# Patient Record
Sex: MALE | Race: BLACK OR AFRICAN AMERICAN | Employment: OTHER | URBAN - NONMETROPOLITAN AREA
[De-identification: names, ages, dates, MRNs, and addresses within clinical notes are randomized per-mention and may not be internally consistent; named-entity substitution may affect disease eponyms.]

---

## 2021-12-19 ENCOUNTER — HOSPITAL ENCOUNTER (EMERGENCY)
Age: 61
Discharge: HOME | End: 2021-12-19
Attending: EMERGENCY MEDICINE
Payer: MEDICARE

## 2021-12-19 VITALS
DIASTOLIC BLOOD PRESSURE: 92 MMHG | WEIGHT: 130 LBS | HEART RATE: 83 BPM | BODY MASS INDEX: 20.89 KG/M2 | HEIGHT: 66 IN | RESPIRATION RATE: 18 BRPM | TEMPERATURE: 97.8 F | OXYGEN SATURATION: 96 % | SYSTOLIC BLOOD PRESSURE: 141 MMHG

## 2021-12-19 DIAGNOSIS — M75.90 BURSITIS AND TENDINITIS OF SHOULDER REGION: ICD-10-CM

## 2021-12-19 DIAGNOSIS — M75.50 BURSITIS AND TENDINITIS OF SHOULDER REGION: ICD-10-CM

## 2021-12-19 DIAGNOSIS — M70.21 OLECRANON BURSITIS OF BOTH ELBOWS: Primary | ICD-10-CM

## 2021-12-19 DIAGNOSIS — M70.22 OLECRANON BURSITIS OF BOTH ELBOWS: Primary | ICD-10-CM

## 2021-12-19 PROCEDURE — 74011250636 HC RX REV CODE- 250/636: Performed by: EMERGENCY MEDICINE

## 2021-12-19 PROCEDURE — 96372 THER/PROPH/DIAG INJ SC/IM: CPT

## 2021-12-19 PROCEDURE — 99282 EMERGENCY DEPT VISIT SF MDM: CPT

## 2021-12-19 RX ORDER — IBUPROFEN 600 MG/1
600 TABLET ORAL
Qty: 20 TABLET | Refills: 0 | Status: SHIPPED | OUTPATIENT
Start: 2021-12-19 | End: 2022-02-19

## 2021-12-19 RX ORDER — PREDNISONE 20 MG/1
20 TABLET ORAL DAILY
Qty: 10 TABLET | Refills: 0 | Status: SHIPPED | OUTPATIENT
Start: 2021-12-19 | End: 2021-12-29

## 2021-12-19 RX ORDER — KETOROLAC TROMETHAMINE 30 MG/ML
60 INJECTION, SOLUTION INTRAMUSCULAR; INTRAVENOUS
Status: COMPLETED | OUTPATIENT
Start: 2021-12-19 | End: 2021-12-19

## 2021-12-19 RX ADMIN — KETOROLAC TROMETHAMINE 60 MG: 30 INJECTION, SOLUTION INTRAMUSCULAR at 18:55

## 2021-12-19 RX ADMIN — METHYLPREDNISOLONE SODIUM SUCCINATE 125 MG: 125 INJECTION, POWDER, FOR SOLUTION INTRAMUSCULAR; INTRAVENOUS at 18:55

## 2021-12-19 NOTE — ED PROVIDER NOTES
EMERGENCY DEPARTMENT HISTORY AND PHYSICAL EXAM      Date: 12/19/2021  Patient Name: Neftali Ivey    History of Presenting Illness     Chief Complaint   Patient presents with    Back Pain    Arm Pain    Shoulder Pain       History Provided By: Patient    HPI: Neftali Ivey, 62 y.o. male with a past medical history significant hypertension presents to the ED with cc of bilateral elbow pain bilateral shoulder pain back pain for 3 months patient denies any trauma    There are no other complaints, changes, or physical findings at this time. PCP: None    No current facility-administered medications on file prior to encounter. No current outpatient medications on file prior to encounter. Past History     Past Medical History:  Past Medical History:   Diagnosis Date    Hypertension        Past Surgical History:  History reviewed. No pertinent surgical history. Family History:  History reviewed. No pertinent family history. Social History:  Social History     Tobacco Use    Smoking status: Current Every Day Smoker     Packs/day: 0.25    Smokeless tobacco: Never Used   Substance Use Topics    Alcohol use: Yes     Alcohol/week: 3.0 standard drinks     Types: 3 Cans of beer per week     Comment: daily    Drug use: Never       Allergies:  No Known Allergies      Review of Systems     Review of Systems   Constitutional: Negative for chills and fever. HENT: Negative for rhinorrhea and sore throat. Eyes: Negative for pain and visual disturbance. Respiratory: Negative for cough and shortness of breath. Cardiovascular: Negative for chest pain and leg swelling. Gastrointestinal: Negative for abdominal pain, diarrhea and vomiting. Endocrine: Negative for polydipsia and polyuria. Genitourinary: Negative for dysuria and urgency. Musculoskeletal: Positive for arthralgias, back pain and joint swelling. Negative for neck pain and neck stiffness.    Skin: Negative for color change, pallor and wound.   Neurological: Negative for weakness and headaches. Psychiatric/Behavioral: Negative. Physical Exam     Physical Exam  Vitals and nursing note reviewed. Constitutional:       General: He is not in acute distress. Appearance: Normal appearance. He is not ill-appearing, toxic-appearing or diaphoretic. HENT:      Head: Normocephalic and atraumatic. Nose: Nose normal.      Mouth/Throat:      Mouth: Mucous membranes are moist.      Pharynx: Oropharynx is clear. Eyes:      Extraocular Movements: Extraocular movements intact. Conjunctiva/sclera: Conjunctivae normal.      Pupils: Pupils are equal, round, and reactive to light. Cardiovascular:      Rate and Rhythm: Normal rate and regular rhythm. Pulses: Normal pulses. Heart sounds: Normal heart sounds. Pulmonary:      Effort: Pulmonary effort is normal.      Breath sounds: Normal breath sounds. Abdominal:      General: Bowel sounds are normal.      Palpations: Abdomen is soft. Tenderness: There is no abdominal tenderness. Musculoskeletal:         General: Swelling and tenderness present. No signs of injury. Right shoulder: Tenderness present. No swelling, bony tenderness or crepitus. Left shoulder: Tenderness present. No swelling, bony tenderness or crepitus. Right elbow: Swelling present. No deformity, effusion or lacerations. Tenderness present in olecranon process. Left elbow: Swelling present. No deformity, effusion or lacerations. Tenderness present in olecranon process. Cervical back: Normal, normal range of motion and neck supple. Thoracic back: Tenderness and bony tenderness present. No deformity or signs of trauma. Lumbar back: Tenderness and bony tenderness present. No deformity or signs of trauma. Comments: No increased warmth, no erythema to overlying skin   Skin:     General: Skin is warm and dry. Capillary Refill: Capillary refill takes less than 2 seconds. Neurological:      General: No focal deficit present. Mental Status: He is alert and oriented to person, place, and time. Psychiatric:         Mood and Affect: Mood normal.         Behavior: Behavior normal.         Lab and Diagnostic Study Results     Labs -   No results found for this or any previous visit (from the past 12 hour(s)). Radiologic Studies -   @lastxrresult@  CT Results  (Last 48 hours)    None        CXR Results  (Last 48 hours)    None            Medical Decision Making   - I am the first provider for this patient. - I reviewed the vital signs, available nursing notes, past medical history, past surgical history, family history and social history. - Initial assessment performed. The patients presenting problems have been discussed, and they are in agreement with the care plan formulated and outlined with them. I have encouraged them to ask questions as they arise throughout their visit. Vital Signs-Reviewed the patient's vital signs. Patient Vitals for the past 12 hrs:   Temp Pulse Resp BP SpO2   12/19/21 1825 97.8 °F (36.6 °C) 83 18 (!) 148/126 96 %       Records Reviewed: Nursing Notes    The patient presents with diffuse joint pain with a differential diagnosis of inflammatory process, musculoskeletal strain      ED Course:          Provider Notes (Medical Decision Making): MDM       Procedures   Medical Decision Makingedical Decision Making  Performed by: Miguel Alcaraz MD  PROCEDURES:  Procedures       Disposition   Disposition: Condition stable and improved  DC- Adult Discharges: All of the diagnostic tests were reviewed and questions answered. Diagnosis, care plan and treatment options were discussed. The patient understands the instructions and will follow up as directed. The patients results have been reviewed with them. They have been counseled regarding their diagnosis.   The patient verbally convey understanding and agreement of the signs, symptoms, diagnosis, treatment and prognosis and additionally agrees to follow up as recommended with their PCP in 24 - 48 hours. They also agree with the care-plan and convey that all of their questions have been answered. I have also put together some discharge instructions for them that include: 1) educational information regarding their diagnosis, 2) how to care for their diagnosis at home, as well a 3) list of reasons why they would want to return to the ED prior to their follow-up appointment, should their condition change. DISCHARGE PLAN:  1. There are no discharge medications for this patient. 2.   Follow-up Information    None       3. Return to ED if worse   4. There are no discharge medications for this patient. Diagnosis     Clinical Impression: No diagnosis found. Attestations:    Abilio Dash MD    Please note that this dictation was completed with [employer], the computer voice recognition software. Quite often unanticipated grammatical, syntax, homophones, and other interpretive errors are inadvertently transcribed by the computer software. Please disregard these errors. Please excuse any errors that have escaped final proofreading. Thank you.

## 2021-12-19 NOTE — ED TRIAGE NOTES
Patient has swelling to both elbows; admits he has slacked in PCP visits, but the pain is progressively worse

## 2022-02-19 ENCOUNTER — HOSPITAL ENCOUNTER (EMERGENCY)
Age: 62
Discharge: HOME | End: 2022-02-19
Attending: EMERGENCY MEDICINE
Payer: MEDICARE

## 2022-02-19 ENCOUNTER — APPOINTMENT (OUTPATIENT)
Dept: GENERAL RADIOLOGY | Age: 62
End: 2022-02-19
Attending: EMERGENCY MEDICINE
Payer: MEDICARE

## 2022-02-19 VITALS
OXYGEN SATURATION: 98 % | RESPIRATION RATE: 19 BRPM | TEMPERATURE: 98.6 F | SYSTOLIC BLOOD PRESSURE: 156 MMHG | HEIGHT: 66 IN | HEART RATE: 97 BPM | DIASTOLIC BLOOD PRESSURE: 96 MMHG | BODY MASS INDEX: 20.89 KG/M2 | WEIGHT: 130 LBS

## 2022-02-19 DIAGNOSIS — R07.9 CHEST PAIN, UNSPECIFIED TYPE: Primary | ICD-10-CM

## 2022-02-19 LAB
ANION GAP SERPL CALC-SCNC: 16 MMOL/L (ref 5–15)
BASOPHILS # BLD: 0.2 K/UL (ref 0–0.2)
BASOPHILS NFR BLD: 3 % (ref 0–2.5)
BNP SERPL-MCNC: 98 PG/ML
BUN SERPL-MCNC: 11 MG/DL (ref 6–20)
BUN/CREAT SERPL: 12 (ref 12–20)
CA-I BLD-MCNC: 9 MG/DL (ref 8.5–10.1)
CHLORIDE SERPL-SCNC: 100 MMOL/L (ref 97–108)
CO2 SERPL-SCNC: 22 MMOL/L (ref 21–32)
CREAT SERPL-MCNC: 0.94 MG/DL (ref 0.7–1.3)
EOSINOPHIL # BLD: 0.1 K/UL (ref 0–0.7)
EOSINOPHIL NFR BLD: 1 % (ref 0.9–2.9)
ERYTHROCYTE [DISTWIDTH] IN BLOOD BY AUTOMATED COUNT: 14.9 % (ref 11.5–14.5)
GLUCOSE SERPL-MCNC: 91 MG/DL (ref 65–100)
HCT VFR BLD AUTO: 46.7 % (ref 41–53)
HGB BLD-MCNC: 15.8 G/DL (ref 13.5–17.5)
INR PPP: 0.9 (ref 0.9–1.1)
LYMPHOCYTES # BLD: 1 K/UL (ref 1–4.8)
LYMPHOCYTES NFR BLD: 13 % (ref 20.5–51.1)
MCH RBC QN AUTO: 31.4 PG (ref 31–34)
MCHC RBC AUTO-ENTMCNC: 33.8 G/DL (ref 31–36)
MCV RBC AUTO: 93 FL (ref 80–100)
MONOCYTES # BLD: 0.5 K/UL (ref 0.2–2.4)
MONOCYTES NFR BLD: 7 % (ref 1.7–9.3)
NEUTS SEG # BLD: 5.9 K/UL (ref 1.8–7.7)
NEUTS SEG NFR BLD: 76 % (ref 42–75)
NRBC # BLD: 0.01 K/UL
NRBC BLD-RTO: 0.1 PER 100 WBC
PLATELET # BLD AUTO: 278 K/UL (ref 150–400)
PMV BLD AUTO: 6.9 FL (ref 6.5–11.5)
POTASSIUM SERPL-SCNC: 3.5 MMOL/L (ref 3.5–5.1)
PROTHROMBIN TIME: 11.5 SEC (ref 11.9–14.6)
RBC # BLD AUTO: 5.02 M/UL (ref 4.5–5.9)
SODIUM SERPL-SCNC: 138 MMOL/L (ref 136–145)
TROPONIN-HIGH SENSITIVITY: 7 NG/L (ref 0–76)
WBC # BLD AUTO: 7.6 K/UL (ref 4.4–11.3)

## 2022-02-19 PROCEDURE — 84484 ASSAY OF TROPONIN QUANT: CPT

## 2022-02-19 PROCEDURE — 85025 COMPLETE CBC W/AUTO DIFF WBC: CPT

## 2022-02-19 PROCEDURE — 85610 PROTHROMBIN TIME: CPT

## 2022-02-19 PROCEDURE — 36415 COLL VENOUS BLD VENIPUNCTURE: CPT

## 2022-02-19 PROCEDURE — 99284 EMERGENCY DEPT VISIT MOD MDM: CPT

## 2022-02-19 PROCEDURE — 71045 X-RAY EXAM CHEST 1 VIEW: CPT

## 2022-02-19 PROCEDURE — 83880 ASSAY OF NATRIURETIC PEPTIDE: CPT

## 2022-02-19 PROCEDURE — 93005 ELECTROCARDIOGRAM TRACING: CPT

## 2022-02-19 PROCEDURE — 80048 BASIC METABOLIC PNL TOTAL CA: CPT

## 2022-02-19 RX ORDER — FAMOTIDINE 20 MG/1
20 TABLET, FILM COATED ORAL 2 TIMES DAILY
Qty: 20 TABLET | Refills: 0 | Status: SHIPPED | OUTPATIENT
Start: 2022-02-19 | End: 2022-03-01

## 2022-02-19 RX ORDER — METOPROLOL TARTRATE 50 MG/1
50 TABLET ORAL
Status: DISCONTINUED | OUTPATIENT
Start: 2022-02-19 | End: 2022-02-19 | Stop reason: HOSPADM

## 2022-02-19 RX ORDER — GUAIFENESIN 100 MG/5ML
324 LIQUID (ML) ORAL
Status: DISCONTINUED | OUTPATIENT
Start: 2022-02-19 | End: 2022-02-19 | Stop reason: HOSPADM

## 2022-02-19 NOTE — ED PROVIDER NOTES
Patient brought to ER with complaint of chest pain for 2 months. He also reports left shoulder pain worse with movement. Pain is moderate. No shortness of breath, nausea or diaphoresis. Duration:  2 months    Intensity: moderate    Modified by: movement    Social History : smokes cigarettes. Drinks occasionally               Past Medical History:   Diagnosis Date    Hypertension        Past Surgical History:   Procedure Laterality Date    HX ORTHOPAEDIC           History reviewed. No pertinent family history. Social History     Socioeconomic History    Marital status: UNKNOWN     Spouse name: Not on file    Number of children: Not on file    Years of education: Not on file    Highest education level: Not on file   Occupational History    Not on file   Tobacco Use    Smoking status: Current Every Day Smoker     Packs/day: 0.25    Smokeless tobacco: Never Used   Vaping Use    Vaping Use: Never used   Substance and Sexual Activity    Alcohol use: Yes     Alcohol/week: 3.0 standard drinks     Types: 3 Cans of beer per week     Comment: daily    Drug use: Never    Sexual activity: Not Currently   Other Topics Concern    Not on file   Social History Narrative    Not on file     Social Determinants of Health     Financial Resource Strain:     Difficulty of Paying Living Expenses: Not on file   Food Insecurity:     Worried About Running Out of Food in the Last Year: Not on file    Luis E of Food in the Last Year: Not on file   Transportation Needs:     Lack of Transportation (Medical): Not on file    Lack of Transportation (Non-Medical):  Not on file   Physical Activity:     Days of Exercise per Week: Not on file    Minutes of Exercise per Session: Not on file   Stress:     Feeling of Stress : Not on file   Social Connections:     Frequency of Communication with Friends and Family: Not on file    Frequency of Social Gatherings with Friends and Family: Not on file    Attends Quaker Services: Not on file    Active Member of Clubs or Organizations: Not on file    Attends Club or Organization Meetings: Not on file    Marital Status: Not on file   Intimate Partner Violence:     Fear of Current or Ex-Partner: Not on file    Emotionally Abused: Not on file    Physically Abused: Not on file    Sexually Abused: Not on file   Housing Stability:     Unable to Pay for Housing in the Last Year: Not on file    Number of [place] in the Last Year: Not on file    Unstable Housing in the Last Year: Not on file         ALLERGIES: Patient has no known allergies. Review of Systems   Constitutional: Negative. Negative for chills, diaphoresis and fever. HENT: Negative. Eyes: Negative. Respiratory: Negative. Negative for shortness of breath. Cardiovascular: Positive for chest pain. Gastrointestinal: Negative for nausea and vomiting. Endocrine: Negative. Genitourinary: Negative. Musculoskeletal: Negative. Left shoulder pain   Skin: Negative. Allergic/Immunologic: Negative. Neurological: Negative. Hematological: Negative. Psychiatric/Behavioral: Negative. Vitals:    02/19/22 0136   BP: (!) 168/98   Pulse: (!) 109   Resp: 19   Temp: 98.6 °F (37 °C)   SpO2: 95%   Weight: 59 kg (130 lb)   Height: 5' 6\" (1.676 m)            Physical Exam  Vitals and nursing note reviewed. Constitutional:       General: He is not in acute distress. Appearance: He is not ill-appearing. HENT:      Head: Normocephalic and atraumatic. Cardiovascular:      Rate and Rhythm: Normal rate and regular rhythm. Pulses: Normal pulses. Heart sounds: Normal heart sounds. Pulmonary:      Effort: Pulmonary effort is normal.      Breath sounds: Normal breath sounds. Abdominal:      General: Abdomen is flat. Bowel sounds are normal.      Palpations: Abdomen is soft. Musculoskeletal:         General: Normal range of motion.       Cervical back: Normal range of motion and neck supple. Skin:     General: Skin is warm and dry. Neurological:      General: No focal deficit present. Mental Status: He is oriented to person, place, and time. Mental status is at baseline.    Psychiatric:         Mood and Affect: Mood normal.         Behavior: Behavior normal.          MDM       Procedures

## 2022-02-19 NOTE — ED NOTES
Pt with sister at bedside in which assiting pt in going home. Pt verbalized understanding of medication prescribed as well follow up care. Pt verbalized no further questions.

## 2022-02-19 NOTE — ED TRIAGE NOTES
Pt states that he has been having chest pain for about a month, shoulder pain for over a year, and has been without hypertension medications for over six months. Pt states that tonight it got hard to breathe and his chest began to hurt.  Rate 8/10

## 2022-02-21 LAB
ATRIAL RATE: 106 BPM
CALCULATED P AXIS, ECG09: 75 DEGREES
CALCULATED R AXIS, ECG10: 71 DEGREES
CALCULATED T AXIS, ECG11: 68 DEGREES
DIAGNOSIS, 93000: NORMAL
P-R INTERVAL, ECG05: 175 MS
Q-T INTERVAL, ECG07: 350 MS
QRS DURATION, ECG06: 79 MS
QTC CALCULATION (BEZET), ECG08: 465 MS
VENTRICULAR RATE, ECG03: 106 BPM

## 2022-03-25 ENCOUNTER — HOSPITAL ENCOUNTER (EMERGENCY)
Age: 62
Discharge: HOME | End: 2022-03-25
Attending: EMERGENCY MEDICINE | Admitting: EMERGENCY MEDICINE
Payer: MEDICARE

## 2022-03-25 ENCOUNTER — APPOINTMENT (OUTPATIENT)
Dept: CT IMAGING | Age: 62
End: 2022-03-25
Attending: EMERGENCY MEDICINE
Payer: MEDICARE

## 2022-03-25 VITALS
SYSTOLIC BLOOD PRESSURE: 162 MMHG | OXYGEN SATURATION: 98 % | TEMPERATURE: 98.4 F | RESPIRATION RATE: 16 BRPM | WEIGHT: 130 LBS | HEIGHT: 66 IN | DIASTOLIC BLOOD PRESSURE: 87 MMHG | BODY MASS INDEX: 20.89 KG/M2 | HEART RATE: 74 BPM

## 2022-03-25 DIAGNOSIS — R10.31 RLQ ABDOMINAL PAIN: Primary | ICD-10-CM

## 2022-03-25 LAB
ALBUMIN SERPL-MCNC: 3.2 G/DL (ref 3.5–5)
ALBUMIN/GLOB SERPL: 1 {RATIO} (ref 1.1–2.2)
ALP SERPL-CCNC: 80 U/L (ref 45–117)
ALT SERPL-CCNC: 17 U/L (ref 12–78)
ANION GAP SERPL CALC-SCNC: 5 MMOL/L (ref 5–15)
APPEARANCE UR: CLEAR
AST SERPL W P-5'-P-CCNC: 16 U/L (ref 15–37)
BACTERIA URNS QL MICRO: NEGATIVE /HPF
BASOPHILS # BLD: 0 K/UL (ref 0–0.1)
BASOPHILS NFR BLD: 1 % (ref 0–1)
BILIRUB SERPL-MCNC: 0.2 MG/DL (ref 0.2–1)
BILIRUB UR QL: NEGATIVE
BUN SERPL-MCNC: 12 MG/DL (ref 6–20)
BUN/CREAT SERPL: 20 (ref 12–20)
CA-I BLD-MCNC: 8.4 MG/DL (ref 8.5–10.1)
CHLORIDE SERPL-SCNC: 104 MMOL/L (ref 97–108)
CO2 SERPL-SCNC: 27 MMOL/L (ref 21–32)
COLOR UR: ABNORMAL
CREAT SERPL-MCNC: 0.61 MG/DL (ref 0.7–1.3)
DIFFERENTIAL METHOD BLD: ABNORMAL
EOSINOPHIL # BLD: 0.1 K/UL (ref 0–0.4)
EOSINOPHIL NFR BLD: 1 % (ref 0–7)
ERYTHROCYTE [DISTWIDTH] IN BLOOD BY AUTOMATED COUNT: 14.3 % (ref 11.5–14.5)
GLOBULIN SER CALC-MCNC: 3.3 G/DL (ref 2–4)
GLUCOSE SERPL-MCNC: 99 MG/DL (ref 65–100)
GLUCOSE UR STRIP.AUTO-MCNC: 50 MG/DL
HCT VFR BLD AUTO: 39.4 % (ref 36.6–50.3)
HGB BLD-MCNC: 13.5 G/DL (ref 12.1–17)
HGB UR QL STRIP: NEGATIVE
IMM GRANULOCYTES # BLD AUTO: 0 K/UL (ref 0–0.04)
IMM GRANULOCYTES NFR BLD AUTO: 0 % (ref 0–0.5)
KETONES UR QL STRIP.AUTO: 5 MG/DL
LEUKOCYTE ESTERASE UR QL STRIP.AUTO: ABNORMAL
LIPASE SERPL-CCNC: 104 U/L (ref 73–393)
LYMPHOCYTES # BLD: 1.4 K/UL (ref 0.8–3.5)
LYMPHOCYTES NFR BLD: 23 % (ref 12–49)
MCH RBC QN AUTO: 31.6 PG (ref 26–34)
MCHC RBC AUTO-ENTMCNC: 34.3 G/DL (ref 30–36.5)
MCV RBC AUTO: 92.3 FL (ref 80–99)
MONOCYTES # BLD: 0.5 K/UL (ref 0–1)
MONOCYTES NFR BLD: 8 % (ref 5–13)
MUCOUS THREADS URNS QL MICRO: ABNORMAL /LPF
NEUTS SEG # BLD: 4.2 K/UL (ref 1.8–8)
NEUTS SEG NFR BLD: 67 % (ref 32–75)
NITRITE UR QL STRIP.AUTO: NEGATIVE
NRBC # BLD: 0 K/UL (ref 0–0.01)
NRBC BLD-RTO: 0 PER 100 WBC
PH UR STRIP: 5 [PH] (ref 5–8)
PLATELET # BLD AUTO: 222 K/UL (ref 150–400)
PMV BLD AUTO: 8.7 FL (ref 8.9–12.9)
POTASSIUM SERPL-SCNC: 4.1 MMOL/L (ref 3.5–5.1)
PROT SERPL-MCNC: 6.5 G/DL (ref 6.4–8.2)
PROT UR STRIP-MCNC: NEGATIVE MG/DL
RBC # BLD AUTO: 4.27 M/UL (ref 4.1–5.7)
RBC #/AREA URNS HPF: ABNORMAL /HPF (ref 0–5)
SODIUM SERPL-SCNC: 136 MMOL/L (ref 136–145)
SP GR UR REFRACTOMETRY: 1.03 (ref 1–1.03)
UA: UC IF INDICATED,UAUC: ABNORMAL
UROBILINOGEN UR QL STRIP.AUTO: 0.1 EU/DL (ref 0.1–1)
WBC # BLD AUTO: 6.1 K/UL (ref 4.1–11.1)
WBC URNS QL MICRO: ABNORMAL /HPF (ref 0–4)

## 2022-03-25 PROCEDURE — 74011000636 HC RX REV CODE- 636: Performed by: EMERGENCY MEDICINE

## 2022-03-25 PROCEDURE — 81001 URINALYSIS AUTO W/SCOPE: CPT

## 2022-03-25 PROCEDURE — 99285 EMERGENCY DEPT VISIT HI MDM: CPT

## 2022-03-25 PROCEDURE — 80053 COMPREHEN METABOLIC PANEL: CPT

## 2022-03-25 PROCEDURE — 85025 COMPLETE CBC W/AUTO DIFF WBC: CPT

## 2022-03-25 PROCEDURE — 74177 CT ABD & PELVIS W/CONTRAST: CPT

## 2022-03-25 PROCEDURE — 36415 COLL VENOUS BLD VENIPUNCTURE: CPT

## 2022-03-25 PROCEDURE — 83690 ASSAY OF LIPASE: CPT

## 2022-03-25 RX ADMIN — IOPAMIDOL 100 ML: 755 INJECTION, SOLUTION INTRAVENOUS at 16:29

## 2022-03-25 NOTE — ED PROVIDER NOTES
EMERGENCY DEPARTMENT HISTORY AND PHYSICAL EXAM      Date: 3/25/2022  Patient Name: Nikko Sanders      History of Presenting Illness     Chief Complaint   Patient presents with    Abdominal Pain       History Provided By: Patient    HPI: Nikko Sanders, 62 y.o. male with a past medical history significant for HTN, Arthritis presents to the ED with cc of abdominal pain. RLQ pain intermittent past 3 weeks. Told by PCP to come to ER for CTAP. Endorses few days of black stools but not tarry. Denies hematochezia, CP, SOB, N/V/D. Denies F/C, cough. Not on AC. There are no other complaints, changes, or physical findings at this time. PCP: Sammy Velez MD        Past History     Past Medical History:  Past Medical History:   Diagnosis Date    Arthritis     Hypertension        Past Surgical History:  Past Surgical History:   Procedure Laterality Date    HX ORTHOPAEDIC         Family History:  History reviewed. No pertinent family history. Social History:  Social History     Tobacco Use    Smoking status: Current Some Day Smoker     Packs/day: 0.25    Smokeless tobacco: Never Used   Vaping Use    Vaping Use: Never used   Substance Use Topics    Alcohol use: Yes     Alcohol/week: 3.0 standard drinks     Types: 3 Cans of beer per week     Comment: daily    Drug use: Never       Allergies:  No Known Allergies      Review of Systems   Constitutional: Negative except as in HPI. Eyes: Negative except as in HPI.  ENT: Negative except as in HPI. Cardiovascular: Negative except as in HPI. Respiratory: Negative except as in HPI. Gastrointestinal: Negative except as in HPI. Genitourinary: Negative except as in HPI. Musculoskeletal: Negative except as in HPI. Integumentary: Negative except as in HPI. Neurological: Negative except as in HPI. Psychiatric: Negative except as in HPI. Endocrine: Negative except as in HPI. Hematologic/Lymphatic: Negative except as in HPI.   Allergic/Immunologic: Negative except as in HPI. Physical Exam   Constitutional: Awake and alert, interactive, NAD  Eyes: PERRL, no injection or scleral icterus, no discharge  HEENT: NCAT, neck supple, MMM, no oropharyngeal exudates  CV: RRR, no m/r/g  Respiratory: CTAB, no r/r/w  GI: Abd soft, nondistended, ttp RLQ and RUQ, slightly guarding. : No blood or melena on BRYNN.   MSK: FROM, no joint effusions or edema  Skin: No rashes  Neuro: CN2-12 intact, symmetric facies, fluent speech. Psych: Well-groomed, normal speech, behavior, appropriate mood    Lab and Diagnostic Study Results     Labs -     Recent Results (from the past 12 hour(s))   CBC WITH AUTOMATED DIFF    Collection Time: 03/25/22  2:41 PM   Result Value Ref Range    WBC 6.1 4.1 - 11.1 K/uL    RBC 4.27 4. 10 - 5.70 M/uL    HGB 13.5 12.1 - 17.0 g/dL    HCT 39.4 36.6 - 50.3 %    MCV 92.3 80.0 - 99.0 FL    MCH 31.6 26.0 - 34.0 PG    MCHC 34.3 30.0 - 36.5 g/dL    RDW 14.3 11.5 - 14.5 %    PLATELET 231 488 - 813 K/uL    MPV 8.7 (L) 8.9 - 12.9 FL    NRBC 0.0 0.0  WBC    ABSOLUTE NRBC 0.00 0.00 - 0.01 K/uL    NEUTROPHILS 67 32 - 75 %    LYMPHOCYTES 23 12 - 49 %    MONOCYTES 8 5 - 13 %    EOSINOPHILS 1 0 - 7 %    BASOPHILS 1 0 - 1 %    IMMATURE GRANULOCYTES 0 0 - 0.5 %    ABS. NEUTROPHILS 4.2 1.8 - 8.0 K/UL    ABS. LYMPHOCYTES 1.4 0.8 - 3.5 K/UL    ABS. MONOCYTES 0.5 0.0 - 1.0 K/UL    ABS. EOSINOPHILS 0.1 0.0 - 0.4 K/UL    ABS. BASOPHILS 0.0 0.0 - 0.1 K/UL    ABS. IMM.  GRANS. 0.0 0.00 - 0.04 K/UL    DF AUTOMATED     METABOLIC PANEL, COMPREHENSIVE    Collection Time: 03/25/22  2:41 PM   Result Value Ref Range    Sodium 136 136 - 145 mmol/L    Potassium 4.1 3.5 - 5.1 mmol/L    Chloride 104 97 - 108 mmol/L    CO2 27 21 - 32 mmol/L    Anion gap 5 5 - 15 mmol/L    Glucose 99 65 - 100 mg/dL    BUN 12 6 - 20 mg/dL    Creatinine 0.61 (L) 0.70 - 1.30 mg/dL    BUN/Creatinine ratio 20 12 - 20      GFR est AA >60 >60 ml/min/1.73m2    GFR est non-AA >60 >60 ml/min/1.73m2    Calcium 8.4 (L) 8.5 - 10.1 mg/dL    Bilirubin, total 0.2 0.2 - 1.0 mg/dL    AST (SGOT) 16 15 - 37 U/L    ALT (SGPT) 17 12 - 78 U/L    Alk. phosphatase 80 45 - 117 U/L    Protein, total 6.5 6.4 - 8.2 g/dL    Albumin 3.2 (L) 3.5 - 5.0 g/dL    Globulin 3.3 2.0 - 4.0 g/dL    A-G Ratio 1.0 (L) 1.1 - 2.2     LIPASE    Collection Time: 03/25/22  2:41 PM   Result Value Ref Range    Lipase 104 73 - 393 U/L   URINALYSIS W/ REFLEX CULTURE    Collection Time: 03/25/22  2:49 PM    Specimen: Urine   Result Value Ref Range    Color Yellow/Straw      Appearance Clear Clear      Specific gravity 1.027 1.003 - 1.030      pH (UA) 5.0 5.0 - 8.0      Protein Negative Negative mg/dL    Glucose 50 (A) Negative mg/dL    Ketone 5 (A) Negative mg/dL    Bilirubin Negative Negative      Blood Negative Negative      Urobilinogen 0.1 0.1 - 1.0 EU/dL    Nitrites Negative Negative      Leukocyte Esterase Trace (A) Negative      UA:UC IF INDICATED Culture not indicated by UA result Culture not indicated by UA result      WBC 0-4 0 - 4 /hpf    RBC 0-5 0 - 5 /hpf    Bacteria Negative Negative /hpf    Mucus Trace /lpf       Radiologic Studies -   [unfilled]  CT Results  (Last 48 hours)               03/25/22 1628  CT ABD PELV W CONT Final result    Impression:  No suggestion of acute cholecystitis. Prostatic enlargement likely   causing bladder outlet obstruction. Accelerated atherosclerosis. Severe   degenerative changes at the lumbosacral junction; is there any suspicion of   chronic disc space infection? Narrative:  CT dose reduction was achieved through use of a standardized protocol tailored   for this examination and automatic exposure control for dose modulation. Contrast study shows clear lung bases       Small liver cyst. Spleen, pancreas, gallbladder are normal. Specifically, no   gallstones seen and no biliary dilatation. No regional edema. Small adrenal   adenomas, clear fat content in the larger left-sided 1, at 2 cm.  Small cyst in   each kidney. Advanced arterial calcification. Fluid present throughout   nondilated small bowel       Moderate central prosthetic enlargement. Normal bladder, colon and appendix. Advanced iliofemoral arterial calcification. No mesenteric fat edema or   abdominal wall hernia       Severe chronic changes at L5-S1. CXR Results  (Last 48 hours)    None          Medical Decision Making and ED Course   - I am the first and primary provider for this patient AND AM THE PRIMARY PROVIDER OF RECORD. - I reviewed the vital signs, available nursing notes, past medical history, past surgical history, family history and social history. - Initial assessment performed. The patients presenting problems have been discussed, and the staff are in agreement with the care plan formulated and outlined with them. I have encouraged them to ask questions as they arise throughout their visit. Vital Signs-Reviewed the patient's vital signs. Patient Vitals for the past 12 hrs:   Temp Pulse Resp BP SpO2   03/25/22 1438 98.4 °F (36.9 °C) 74 16 (!) 162/87 98 %       EKG interpretation:         Provider Notes (Medical Decision Making):   61M w/abdominal pain, ?melena vs dark stools. No e/o bleeding on exam. Will get CTAP to eval for appendicitis, diverticulitis, obstruction, etc. Dispo pending workup. ED Course:       ED Course as of 03/25/22 1642   Fri Mar 25, 2022   1525 HGB: 13.5 [YA]   1639 CT ABD PELV W CONT    IMPRESSION  No suggestion of acute cholecystitis. Prostatic enlargement likely  causing bladder outlet obstruction. Accelerated atherosclerosis. Severe  degenerative changes at the lumbosacral junction; is there any suspicion of  chronic disc space infection? [YA]   4391 Suspect enteritis causing pain. No THAD. Tolerating PO. Hgb wnl and no e/o melena on exam. Will discharge with return precautions.  [YA]      ED Course User Index  [YA] Rita Brennan MD           Disposition     Disposition: DC- Adult Discharges: All of the diagnostic tests were reviewed and questions answered. Diagnosis, care plan and treatment options were discussed. The patient understands the instructions and will follow up as directed. The patients results have been reviewed with them. They have been counseled regarding their diagnosis. The patient verbally convey understanding and agreement of the signs, symptoms, diagnosis, treatment and prognosis and additionally agrees to follow up as recommended with their PCP in 24 - 48 hours. They also agree with the care-plan and convey that all of their questions have been answered. I have also put together some discharge instructions for them that include: 1) educational information regarding their diagnosis, 2) how to care for their diagnosis at home, as well a 3) list of reasons why they would want to return to the ED prior to their follow-up appointment, should their condition change. Discharged      Diagnosis     Clinical Impression:   1. RLQ abdominal pain        Attestations:     Ana Arnold MD

## 2022-03-25 NOTE — DISCHARGE INSTRUCTIONS
You were seen in the ER for your lower abdominal pain. Thankfully, we were able to rule out dangerous causes like a dangerous infection or bleeding in your stool. This may be from a number of issues. You should follow up with your primary care doctor or a stomach doctor to make sure this problem is getting better. Return to the ER for any new severe pain, vomiting, fevers, or any other new or concerning symptoms. Thank you! Thank you for allowing me to care for you in the emergency department. I sincerely hope that you are satisfied with your visit today. It is my goal to provide you with excellent care. Below you will find a list of your labs and imaging from your visit today. Should you have any questions regarding these results please do not hesitate to call the emergency department. Labs -     Recent Results (from the past 12 hour(s))   CBC WITH AUTOMATED DIFF    Collection Time: 03/25/22  2:41 PM   Result Value Ref Range    WBC 6.1 4.1 - 11.1 K/uL    RBC 4.27 4. 10 - 5.70 M/uL    HGB 13.5 12.1 - 17.0 g/dL    HCT 39.4 36.6 - 50.3 %    MCV 92.3 80.0 - 99.0 FL    MCH 31.6 26.0 - 34.0 PG    MCHC 34.3 30.0 - 36.5 g/dL    RDW 14.3 11.5 - 14.5 %    PLATELET 297 561 - 780 K/uL    MPV 8.7 (L) 8.9 - 12.9 FL    NRBC 0.0 0.0  WBC    ABSOLUTE NRBC 0.00 0.00 - 0.01 K/uL    NEUTROPHILS 67 32 - 75 %    LYMPHOCYTES 23 12 - 49 %    MONOCYTES 8 5 - 13 %    EOSINOPHILS 1 0 - 7 %    BASOPHILS 1 0 - 1 %    IMMATURE GRANULOCYTES 0 0 - 0.5 %    ABS. NEUTROPHILS 4.2 1.8 - 8.0 K/UL    ABS. LYMPHOCYTES 1.4 0.8 - 3.5 K/UL    ABS. MONOCYTES 0.5 0.0 - 1.0 K/UL    ABS. EOSINOPHILS 0.1 0.0 - 0.4 K/UL    ABS. BASOPHILS 0.0 0.0 - 0.1 K/UL    ABS. IMM.  GRANS. 0.0 0.00 - 0.04 K/UL    DF AUTOMATED     METABOLIC PANEL, COMPREHENSIVE    Collection Time: 03/25/22  2:41 PM   Result Value Ref Range    Sodium 136 136 - 145 mmol/L    Potassium 4.1 3.5 - 5.1 mmol/L    Chloride 104 97 - 108 mmol/L    CO2 27 21 - 32 mmol/L    Anion gap 5 5 - 15 mmol/L    Glucose 99 65 - 100 mg/dL    BUN 12 6 - 20 mg/dL    Creatinine 0.61 (L) 0.70 - 1.30 mg/dL    BUN/Creatinine ratio 20 12 - 20      GFR est AA >60 >60 ml/min/1.73m2    GFR est non-AA >60 >60 ml/min/1.73m2    Calcium 8.4 (L) 8.5 - 10.1 mg/dL    Bilirubin, total 0.2 0.2 - 1.0 mg/dL    AST (SGOT) 16 15 - 37 U/L    ALT (SGPT) 17 12 - 78 U/L    Alk. phosphatase 80 45 - 117 U/L    Protein, total 6.5 6.4 - 8.2 g/dL    Albumin 3.2 (L) 3.5 - 5.0 g/dL    Globulin 3.3 2.0 - 4.0 g/dL    A-G Ratio 1.0 (L) 1.1 - 2.2     LIPASE    Collection Time: 03/25/22  2:41 PM   Result Value Ref Range    Lipase 104 73 - 393 U/L   URINALYSIS W/ REFLEX CULTURE    Collection Time: 03/25/22  2:49 PM    Specimen: Urine   Result Value Ref Range    Color Yellow/Straw      Appearance Clear Clear      Specific gravity 1.027 1.003 - 1.030      pH (UA) 5.0 5.0 - 8.0      Protein Negative Negative mg/dL    Glucose 50 (A) Negative mg/dL    Ketone 5 (A) Negative mg/dL    Bilirubin Negative Negative      Blood Negative Negative      Urobilinogen 0.1 0.1 - 1.0 EU/dL    Nitrites Negative Negative      Leukocyte Esterase Trace (A) Negative      UA:UC IF INDICATED Culture not indicated by UA result Culture not indicated by UA result      WBC 0-4 0 - 4 /hpf    RBC 0-5 0 - 5 /hpf    Bacteria Negative Negative /hpf    Mucus Trace /lpf       Radiologic Studies -   CT ABD PELV W CONT   Final Result   No suggestion of acute cholecystitis. Prostatic enlargement likely   causing bladder outlet obstruction. Accelerated atherosclerosis. Severe   degenerative changes at the lumbosacral junction; is there any suspicion of   chronic disc space infection? CT Results  (Last 48 hours)                 03/25/22 1628  CT ABD PELV W CONT Final result    Impression:  No suggestion of acute cholecystitis. Prostatic enlargement likely   causing bladder outlet obstruction. Accelerated atherosclerosis.  Severe   degenerative changes at the lumbosacral junction; is there any suspicion of   chronic disc space infection? Narrative:  CT dose reduction was achieved through use of a standardized protocol tailored   for this examination and automatic exposure control for dose modulation. Contrast study shows clear lung bases       Small liver cyst. Spleen, pancreas, gallbladder are normal. Specifically, no   gallstones seen and no biliary dilatation. No regional edema. Small adrenal   adenomas, clear fat content in the larger left-sided 1, at 2 cm. Small cyst in   each kidney. Advanced arterial calcification. Fluid present throughout   nondilated small bowel       Moderate central prosthetic enlargement. Normal bladder, colon and appendix. Advanced iliofemoral arterial calcification. No mesenteric fat edema or   abdominal wall hernia       Severe chronic changes at L5-S1. CXR Results  (Last 48 hours)      None               If you feel that you have not received excellent quality care or timely care, please ask to speak to the nurse manager. Please choose us in the future for your continued health care needs. ------------------------------------------------------------------------------------------------------------  The exam and treatment you received in the Emergency Department were for an urgent problem and are not intended as complete care. It is important that you follow-up with a doctor, nurse practitioner, or physician assistant to:  (1) confirm your diagnosis,  (2) re-evaluation of changes in your illness and treatment, and  (3) for ongoing care. If your symptoms become worse or you do not improve as expected and you are unable to reach your usual health care provider, you should return to the Emergency Department. We are available 24 hours a day. Please take your discharge instructions with you when you go to your follow-up appointment.      If you have any problem arranging a follow-up appointment, contact the Emergency Department immediately. If a prescription has been provided, please have it filled as soon as possible to prevent a delay in treatment. Read the entire medication instruction sheet provided to you by the pharmacy. If you have any questions or reservations about taking the medication due to side effects or interactions with other medications, please call your primary care physician or contact the ER to speak with the charge nurse. Make an appointment with your family doctor or the physician you were referred to for follow-up of this visit as instructed on your discharge paperwork, as this is a mandatory follow-up. Return to the ER if you are unable to be seen or if you are unable to be seen in a timely manner. If you have any problem arranging the follow-up visit, contact the Emergency Department immediately.

## 2022-03-25 NOTE — ED TRIAGE NOTES
RUQ abdominal pain and decreased PO intake x3 weeks. Denies N/V/D. Sent by Dr. Letty Gallardo for CT. Pt reports daily Motrin for arthritis pain.

## 2022-06-14 ENCOUNTER — HOSPITAL ENCOUNTER (EMERGENCY)
Age: 62
Discharge: HOME | End: 2022-06-15
Attending: STUDENT IN AN ORGANIZED HEALTH CARE EDUCATION/TRAINING PROGRAM
Payer: MEDICARE

## 2022-06-14 ENCOUNTER — APPOINTMENT (OUTPATIENT)
Dept: CT IMAGING | Age: 62
End: 2022-06-14
Attending: STUDENT IN AN ORGANIZED HEALTH CARE EDUCATION/TRAINING PROGRAM
Payer: MEDICARE

## 2022-06-14 DIAGNOSIS — R10.13 ABDOMINAL PAIN, EPIGASTRIC: Primary | ICD-10-CM

## 2022-06-14 DIAGNOSIS — K64.9 HEMORRHOIDS, UNSPECIFIED HEMORRHOID TYPE: ICD-10-CM

## 2022-06-14 LAB
ALBUMIN SERPL-MCNC: 3.1 G/DL (ref 3.5–5)
ALBUMIN/GLOB SERPL: 1 {RATIO} (ref 1.1–2.2)
ALP SERPL-CCNC: 77 U/L (ref 45–117)
ALT SERPL-CCNC: 16 U/L (ref 12–78)
ANION GAP SERPL CALC-SCNC: 6 MMOL/L (ref 5–15)
AST SERPL W P-5'-P-CCNC: 14 U/L (ref 15–37)
BASOPHILS # BLD: 0.1 K/UL (ref 0–0.1)
BASOPHILS NFR BLD: 1 % (ref 0–1)
BILIRUB SERPL-MCNC: 0.2 MG/DL (ref 0.2–1)
BUN SERPL-MCNC: 20 MG/DL (ref 6–20)
BUN/CREAT SERPL: 22 (ref 12–20)
CA-I BLD-MCNC: 8.8 MG/DL (ref 8.5–10.1)
CHLORIDE SERPL-SCNC: 104 MMOL/L (ref 97–108)
CO2 SERPL-SCNC: 28 MMOL/L (ref 21–32)
COLLECT DATE STL: ABNORMAL
CREAT SERPL-MCNC: 0.92 MG/DL (ref 0.7–1.3)
DIFFERENTIAL METHOD BLD: ABNORMAL
EOSINOPHIL # BLD: 0.1 K/UL (ref 0–0.4)
EOSINOPHIL NFR BLD: 1 % (ref 0–7)
ERYTHROCYTE [DISTWIDTH] IN BLOOD BY AUTOMATED COUNT: 14 % (ref 11.5–14.5)
GLOBULIN SER CALC-MCNC: 3 G/DL (ref 2–4)
GLUCOSE SERPL-MCNC: 105 MG/DL (ref 65–100)
HCT VFR BLD AUTO: 33.8 % (ref 36.6–50.3)
HEMOCCULT SP1 STL QL: POSITIVE
HGB BLD-MCNC: 11.4 G/DL (ref 12.1–17)
IMM GRANULOCYTES # BLD AUTO: 0 K/UL (ref 0–0.04)
IMM GRANULOCYTES NFR BLD AUTO: 0 % (ref 0–0.5)
INR PPP: 1 (ref 0.9–1.1)
LYMPHOCYTES # BLD: 1.3 K/UL (ref 0.8–3.5)
LYMPHOCYTES NFR BLD: 21 % (ref 12–49)
MCH RBC QN AUTO: 32.1 PG (ref 26–34)
MCHC RBC AUTO-ENTMCNC: 33.7 G/DL (ref 30–36.5)
MCV RBC AUTO: 95.2 FL (ref 80–99)
MONOCYTES # BLD: 0.5 K/UL (ref 0–1)
MONOCYTES NFR BLD: 9 % (ref 5–13)
NEUTS SEG # BLD: 4.2 K/UL (ref 1.8–8)
NEUTS SEG NFR BLD: 68 % (ref 32–75)
NRBC # BLD: 0 K/UL (ref 0–0.01)
NRBC BLD-RTO: 0 PER 100 WBC
PLATELET # BLD AUTO: 271 K/UL (ref 150–400)
PMV BLD AUTO: 9 FL (ref 8.9–12.9)
POTASSIUM SERPL-SCNC: 4.5 MMOL/L (ref 3.5–5.1)
PROT SERPL-MCNC: 6.1 G/DL (ref 6.4–8.2)
PROTHROMBIN TIME: 13 SEC (ref 11.9–14.6)
RBC # BLD AUTO: 3.55 M/UL (ref 4.1–5.7)
SODIUM SERPL-SCNC: 138 MMOL/L (ref 136–145)
WBC # BLD AUTO: 6.2 K/UL (ref 4.1–11.1)

## 2022-06-14 PROCEDURE — 80053 COMPREHEN METABOLIC PANEL: CPT

## 2022-06-14 PROCEDURE — 96374 THER/PROPH/DIAG INJ IV PUSH: CPT

## 2022-06-14 PROCEDURE — 82272 OCCULT BLD FECES 1-3 TESTS: CPT

## 2022-06-14 PROCEDURE — 85025 COMPLETE CBC W/AUTO DIFF WBC: CPT

## 2022-06-14 PROCEDURE — 74011000250 HC RX REV CODE- 250: Performed by: STUDENT IN AN ORGANIZED HEALTH CARE EDUCATION/TRAINING PROGRAM

## 2022-06-14 PROCEDURE — 36415 COLL VENOUS BLD VENIPUNCTURE: CPT

## 2022-06-14 PROCEDURE — 74176 CT ABD & PELVIS W/O CONTRAST: CPT

## 2022-06-14 PROCEDURE — 99284 EMERGENCY DEPT VISIT MOD MDM: CPT

## 2022-06-14 PROCEDURE — 85610 PROTHROMBIN TIME: CPT

## 2022-06-14 PROCEDURE — 74011250637 HC RX REV CODE- 250/637: Performed by: STUDENT IN AN ORGANIZED HEALTH CARE EDUCATION/TRAINING PROGRAM

## 2022-06-14 PROCEDURE — 74011250636 HC RX REV CODE- 250/636: Performed by: STUDENT IN AN ORGANIZED HEALTH CARE EDUCATION/TRAINING PROGRAM

## 2022-06-14 RX ORDER — LIDOCAINE HYDROCHLORIDE 20 MG/ML
15 SOLUTION OROPHARYNGEAL
Status: COMPLETED | OUTPATIENT
Start: 2022-06-14 | End: 2022-06-14

## 2022-06-14 RX ORDER — MAG HYDROX/ALUMINUM HYD/SIMETH 200-200-20
30 SUSPENSION, ORAL (FINAL DOSE FORM) ORAL ONCE
Status: COMPLETED | OUTPATIENT
Start: 2022-06-14 | End: 2022-06-14

## 2022-06-14 RX ADMIN — SODIUM CHLORIDE, PRESERVATIVE FREE 20 MG: 5 INJECTION INTRAVENOUS at 23:41

## 2022-06-14 RX ADMIN — LIDOCAINE HYDROCHLORIDE 15 ML: 20 SOLUTION ORAL; TOPICAL at 23:41

## 2022-06-14 RX ADMIN — ALUMINUM HYDROXIDE, MAGNESIUM HYDROXIDE, AND SIMETHICONE 30 ML: 200; 200; 20 SUSPENSION ORAL at 23:42

## 2022-06-15 VITALS
RESPIRATION RATE: 17 BRPM | SYSTOLIC BLOOD PRESSURE: 159 MMHG | WEIGHT: 130 LBS | DIASTOLIC BLOOD PRESSURE: 91 MMHG | HEART RATE: 80 BPM | BODY MASS INDEX: 20.4 KG/M2 | HEIGHT: 67 IN | TEMPERATURE: 97.9 F | OXYGEN SATURATION: 99 %

## 2022-06-15 LAB
HCT VFR BLD AUTO: 35.6 % (ref 36.6–50.3)
HGB BLD-MCNC: 11.6 G/DL (ref 12.1–17)

## 2022-06-15 PROCEDURE — 36415 COLL VENOUS BLD VENIPUNCTURE: CPT

## 2022-06-15 PROCEDURE — 85018 HEMOGLOBIN: CPT

## 2022-06-15 RX ORDER — FAMOTIDINE 40 MG/1
40 TABLET, FILM COATED ORAL
Qty: 10 TABLET | Refills: 0 | Status: SHIPPED | OUTPATIENT
Start: 2022-06-15 | End: 2022-06-22

## 2022-06-15 NOTE — DISCHARGE INSTRUCTIONS
Thank you! Thank you for allowing me to care for you in the emergency department. I sincerely hope that you are satisfied with your visit today. It is my goal to provide you with excellent care. Below you will find a list of your labs and imaging from your visit today. Should you have any questions regarding these results please do not hesitate to call the emergency department. Labs -     Recent Results (from the past 12 hour(s))   CBC WITH AUTOMATED DIFF    Collection Time: 06/14/22  9:10 PM   Result Value Ref Range    WBC 6.2 4.1 - 11.1 K/uL    RBC 3.55 (L) 4.10 - 5.70 M/uL    HGB 11.4 (L) 12.1 - 17.0 g/dL    HCT 33.8 (L) 36.6 - 50.3 %    MCV 95.2 80.0 - 99.0 FL    MCH 32.1 26.0 - 34.0 PG    MCHC 33.7 30.0 - 36.5 g/dL    RDW 14.0 11.5 - 14.5 %    PLATELET 058 773 - 193 K/uL    MPV 9.0 8.9 - 12.9 FL    NRBC 0.0 0.0  WBC    ABSOLUTE NRBC 0.00 0.00 - 0.01 K/uL    NEUTROPHILS 68 32 - 75 %    LYMPHOCYTES 21 12 - 49 %    MONOCYTES 9 5 - 13 %    EOSINOPHILS 1 0 - 7 %    BASOPHILS 1 0 - 1 %    IMMATURE GRANULOCYTES 0 0 - 0.5 %    ABS. NEUTROPHILS 4.2 1.8 - 8.0 K/UL    ABS. LYMPHOCYTES 1.3 0.8 - 3.5 K/UL    ABS. MONOCYTES 0.5 0.0 - 1.0 K/UL    ABS. EOSINOPHILS 0.1 0.0 - 0.4 K/UL    ABS. BASOPHILS 0.1 0.0 - 0.1 K/UL    ABS. IMM.  GRANS. 0.0 0.00 - 0.04 K/UL    DF AUTOMATED     PROTHROMBIN TIME + INR    Collection Time: 06/14/22  9:10 PM   Result Value Ref Range    Prothrombin time 13.0 11.9 - 14.6 sec    INR 1.0 0.9 - 1.1     METABOLIC PANEL, COMPREHENSIVE    Collection Time: 06/14/22  9:10 PM   Result Value Ref Range    Sodium 138 136 - 145 mmol/L    Potassium 4.5 3.5 - 5.1 mmol/L    Chloride 104 97 - 108 mmol/L    CO2 28 21 - 32 mmol/L    Anion gap 6 5 - 15 mmol/L    Glucose 105 (H) 65 - 100 mg/dL    BUN 20 6 - 20 mg/dL    Creatinine 0.92 0.70 - 1.30 mg/dL    BUN/Creatinine ratio 22 (H) 12 - 20      GFR est AA >60 >60 ml/min/1.73m2    GFR est non-AA >60 >60 ml/min/1.73m2    Calcium 8.8 8.5 - 10.1 mg/dL Bilirubin, total 0.2 0.2 - 1.0 mg/dL    AST (SGOT) 14 (L) 15 - 37 U/L    ALT (SGPT) 16 12 - 78 U/L    Alk. phosphatase 77 45 - 117 U/L    Protein, total 6.1 (L) 6.4 - 8.2 g/dL    Albumin 3.1 (L) 3.5 - 5.0 g/dL    Globulin 3.0 2.0 - 4.0 g/dL    A-G Ratio 1.0 (L) 1.1 - 2.2     OCCULT BLOOD, STOOL    Collection Time: 06/14/22  9:10 PM   Result Value Ref Range    Occult Blood,day 1 Positive (A) Negative      Day 1 date: 6,142,022     HGB & HCT    Collection Time: 06/15/22 12:45 AM   Result Value Ref Range    HGB 11.6 (L) 12.1 - 17.0 g/dL    HCT 35.6 (L) 36.6 - 50.3 %       Radiologic Studies -   CT ABD PELV WO CONT   Final Result      No urinary stone, inflammatory changes or bowel obstruction. Atherosclerosis. Severe degenerative changes of L5-S1 again noted. Stable   bilateral adrenal lesions which may represent adenomas or other. Follow-up as   clinically indicated. Please see full report. CT Results  (Last 48 hours)                 06/14/22 2243  CT ABD PELV WO CONT Final result    Impression:      No urinary stone, inflammatory changes or bowel obstruction. Atherosclerosis. Severe degenerative changes of L5-S1 again noted. Stable   bilateral adrenal lesions which may represent adenomas or other. Follow-up as   clinically indicated. Please see full report. Narrative:  CT abdomen and pelvis without contrast       Dose reduction: All CT scans at this facility are performed using dose reduction   optimization techniques as appropriate to a performed exam including the   following: Automated exposure control, adjustments of the mA and/or kV according   to patient size, or use of iterative reconstruction technique. INDICATION: Abdominal pain       COMPARISON: 3/25/2022       FINDINGS:       No airspace disease in the lung bases. Liver lesions are too small to   characterize, may represent cyst or other. Gallbladder is contracted without   pericholecystic stranding.  Spleen and pancreas are unremarkable on this   noncontrast study. No urinary stone or hydronephrosis on either side. Bilateral renal cysts. Stable   2 cm right and 2.7 cm left adrenal lesions which may represent adenomas or   other, although these are not completely evaluated on this study. No aneurysm of   abdominal aorta. Atherosclerosis. There are stenotic foci of the iliac and   visualized proximal lower extremity arteries. No bowel obstruction. Normal appendix. There may be few colonic diverticula   without acute diverticulitis. There may be duodenal diverticulum. No abscess. No   free intraperitoneal air. Prostate is stable in size measuring about 4.5 x 3.8   cm in diameter. No free fluid in the pelvis. No acute fracture in the visualized   bony structures. Severe degenerative changes at the L5-S1 again seen. Chronic   infection should be excluded clinically. CXR Results  (Last 48 hours)      None               If you feel that you have not received excellent quality care or timely care, please ask to speak to the nurse manager. Please choose us in the future for your continued health care needs. ------------------------------------------------------------------------------------------------------------  The exam and treatment you received in the Emergency Department were for an urgent problem and are not intended as complete care. It is important that you follow-up with a doctor, nurse practitioner, or physician assistant to:  (1) confirm your diagnosis,  (2) re-evaluation of changes in your illness and treatment, and  (3) for ongoing care. If your symptoms become worse or you do not improve as expected and you are unable to reach your usual health care provider, you should return to the Emergency Department. We are available 24 hours a day. Please take your discharge instructions with you when you go to your follow-up appointment.      If you have any problem arranging a follow-up appointment, contact the Emergency Department immediately. If a prescription has been provided, please have it filled as soon as possible to prevent a delay in treatment. Read the entire medication instruction sheet provided to you by the pharmacy. If you have any questions or reservations about taking the medication due to side effects or interactions with other medications, please call your primary care physician or contact the ER to speak with the charge nurse. Make an appointment with your family doctor or the physician you were referred to for follow-up of this visit as instructed on your discharge paperwork, as this is a mandatory follow-up. Return to the ER if you are unable to be seen or if you are unable to be seen in a timely manner. If you have any problem arranging the follow-up visit, contact the Emergency Department immediately.

## 2022-06-15 NOTE — ED PROVIDER NOTES
[facility]  EMERGENCY DEPARTMENT ENCOUNTER NOTE    Date: 6/14/2022  Patient Name: Galina Álvarez      History of Presenting Illness     Chief Complaint   Patient presents with    Abdominal Pain       History Provided By: Patient    HPI: Galina Álvarez, 62 y.o. male with PMH of HTN and arthritis presents to the ED with abdominal pain and blood in stool. Patient reports that he has abdominal pain that is been going for several weeks. Has something similar in the past.  Seen by his primary care doctor in which she was told that he needs to follow-up. He has not follow-up with gastroenterology since then. Today he noted that he has dark-colored stool and mixed up with some bright red blood. No fever, chills or sweats. Denies any dysuria, hematuria or increased frequency. Patient is denying any constipation or diarrhea. There are no other complaints, changes, or physical findings at this time. PCP: Calvin Obando MD        Past History     Past Medical History:  Past Medical History:   Diagnosis Date    Arthritis     Hypertension        Past Surgical History:  Past Surgical History:   Procedure Laterality Date    HX ORTHOPAEDIC         Family History:  History reviewed. No pertinent family history. Social History:  Social History     Tobacco Use    Smoking status: Current Some Day Smoker     Packs/day: 0.25    Smokeless tobacco: Never Used   Vaping Use    Vaping Use: Never used   Substance Use Topics    Alcohol use: Yes     Alcohol/week: 3.0 standard drinks     Types: 3 Cans of beer per week     Comment: daily    Drug use: Never       Allergies:  No Known Allergies      Review of Systems     Review of Systems    A 10 point review of system was performed and was negative except as noted above in HPI    Physical Exam     Physical Exam  Vitals and nursing note reviewed. Exam conducted with a chaperone present.    Constitutional:       General: He is not in acute distress. Appearance: He is not ill-appearing, toxic-appearing or diaphoretic. HENT:      Head: Normocephalic and atraumatic. Eyes:      Extraocular Movements: Extraocular movements intact. Pupils: Pupils are equal, round, and reactive to light. Cardiovascular:      Rate and Rhythm: Normal rate and regular rhythm. Pulmonary:      Effort: Pulmonary effort is normal.      Breath sounds: Normal breath sounds. Abdominal:      Palpations: Abdomen is soft. Tenderness: There is abdominal tenderness. Genitourinary:     Comments: Dark stool mixed up with bright red blood. Hemorrhoid noted. Skin:     General: Skin is warm and dry. Neurological:      Mental Status: He is alert and oriented to person, place, and time. Lab and Diagnostic Study Results     Labs -     Recent Results (from the past 12 hour(s))   CBC WITH AUTOMATED DIFF    Collection Time: 06/14/22  9:10 PM   Result Value Ref Range    WBC 6.2 4.1 - 11.1 K/uL    RBC 3.55 (L) 4.10 - 5.70 M/uL    HGB 11.4 (L) 12.1 - 17.0 g/dL    HCT 33.8 (L) 36.6 - 50.3 %    MCV 95.2 80.0 - 99.0 FL    MCH 32.1 26.0 - 34.0 PG    MCHC 33.7 30.0 - 36.5 g/dL    RDW 14.0 11.5 - 14.5 %    PLATELET 020 124 - 836 K/uL    MPV 9.0 8.9 - 12.9 FL    NRBC 0.0 0.0  WBC    ABSOLUTE NRBC 0.00 0.00 - 0.01 K/uL    NEUTROPHILS 68 32 - 75 %    LYMPHOCYTES 21 12 - 49 %    MONOCYTES 9 5 - 13 %    EOSINOPHILS 1 0 - 7 %    BASOPHILS 1 0 - 1 %    IMMATURE GRANULOCYTES 0 0 - 0.5 %    ABS. NEUTROPHILS 4.2 1.8 - 8.0 K/UL    ABS. LYMPHOCYTES 1.3 0.8 - 3.5 K/UL    ABS. MONOCYTES 0.5 0.0 - 1.0 K/UL    ABS. EOSINOPHILS 0.1 0.0 - 0.4 K/UL    ABS. BASOPHILS 0.1 0.0 - 0.1 K/UL    ABS. IMM.  GRANS. 0.0 0.00 - 0.04 K/UL    DF AUTOMATED     PROTHROMBIN TIME + INR    Collection Time: 06/14/22  9:10 PM   Result Value Ref Range    Prothrombin time 13.0 11.9 - 14.6 sec    INR 1.0 0.9 - 1.1     METABOLIC PANEL, COMPREHENSIVE    Collection Time: 06/14/22  9:10 PM   Result Value Ref Range    Sodium 138 136 - 145 mmol/L    Potassium 4.5 3.5 - 5.1 mmol/L    Chloride 104 97 - 108 mmol/L    CO2 28 21 - 32 mmol/L    Anion gap 6 5 - 15 mmol/L    Glucose 105 (H) 65 - 100 mg/dL    BUN 20 6 - 20 mg/dL    Creatinine 0.92 0.70 - 1.30 mg/dL    BUN/Creatinine ratio 22 (H) 12 - 20      GFR est AA >60 >60 ml/min/1.73m2    GFR est non-AA >60 >60 ml/min/1.73m2    Calcium 8.8 8.5 - 10.1 mg/dL    Bilirubin, total 0.2 0.2 - 1.0 mg/dL    AST (SGOT) 14 (L) 15 - 37 U/L    ALT (SGPT) 16 12 - 78 U/L    Alk. phosphatase 77 45 - 117 U/L    Protein, total 6.1 (L) 6.4 - 8.2 g/dL    Albumin 3.1 (L) 3.5 - 5.0 g/dL    Globulin 3.0 2.0 - 4.0 g/dL    A-G Ratio 1.0 (L) 1.1 - 2.2     OCCULT BLOOD, STOOL    Collection Time: 06/14/22  9:10 PM   Result Value Ref Range    Occult Blood,day 1 Positive (A) Negative      Day 1 date: 6,142,022     HGB & HCT    Collection Time: 06/15/22 12:45 AM   Result Value Ref Range    HGB 11.6 (L) 12.1 - 17.0 g/dL    HCT 35.6 (L) 36.6 - 50.3 %       Radiologic Studies -   [unfilled]  CT Results  (Last 48 hours)               06/14/22 2243  CT ABD PELV WO CONT Final result    Impression:      No urinary stone, inflammatory changes or bowel obstruction. Atherosclerosis. Severe degenerative changes of L5-S1 again noted. Stable   bilateral adrenal lesions which may represent adenomas or other. Follow-up as   clinically indicated. Please see full report. Narrative:  CT abdomen and pelvis without contrast       Dose reduction: All CT scans at this facility are performed using dose reduction   optimization techniques as appropriate to a performed exam including the   following: Automated exposure control, adjustments of the mA and/or kV according   to patient size, or use of iterative reconstruction technique. INDICATION: Abdominal pain       COMPARISON: 3/25/2022       FINDINGS:       No airspace disease in the lung bases.  Liver lesions are too small to   characterize, may represent cyst or other. Gallbladder is contracted without   pericholecystic stranding. Spleen and pancreas are unremarkable on this   noncontrast study. No urinary stone or hydronephrosis on either side. Bilateral renal cysts. Stable   2 cm right and 2.7 cm left adrenal lesions which may represent adenomas or   other, although these are not completely evaluated on this study. No aneurysm of   abdominal aorta. Atherosclerosis. There are stenotic foci of the iliac and   visualized proximal lower extremity arteries. No bowel obstruction. Normal appendix. There may be few colonic diverticula   without acute diverticulitis. There may be duodenal diverticulum. No abscess. No   free intraperitoneal air. Prostate is stable in size measuring about 4.5 x 3.8   cm in diameter. No free fluid in the pelvis. No acute fracture in the visualized   bony structures. Severe degenerative changes at the L5-S1 again seen. Chronic   infection should be excluded clinically. CXR Results  (Last 48 hours)    None          Medical Decision Making and ED Course   - I am the first and primary provider for this patient AND AM THE PRIMARY PROVIDER OF RECORD. - I reviewed the vital signs, available nursing notes, past medical history, past surgical history, family history and social history. - Initial assessment performed. The patients presenting problems have been discussed, and the staff are in agreement with the care plan formulated and outlined with them. I have encouraged them to ask questions as they arise throughout their visit. Vital Signs-Reviewed the patient's vital signs.     Patient Vitals for the past 24 hrs:   Temp Pulse Resp BP SpO2   06/15/22 0138 -- 80 17 (!) 159/91 99 %   06/14/22 2346 -- 82 17 (!) 161/93 --   06/14/22 2248 -- 81 16 (!) 160/91 98 %   06/14/22 2123 -- 76 18 (!) 146/92 98 %   06/14/22 2039 97.9 °F (36.6 °C) 88 16 (!) 147/89 100 %       Records Reviewed: Nursing Notes    Provider Notes (Medical Decision Making):     ED Course as of 06/15/22 0158   Tue Jun 14, 2022   2130 Patient presented to the ED with epigastric pain and concerns for rectal bleeding. Pain is mild and no signs of peritoneal irritation. Is denying any nausea, vomiting or hematemesis. No prior similar episodes. Regarding his rectal bleeding, a his fresh blood noted and there is evidence of hemorrhoids. This is likely culprit of his bleeding. However, last obtain CBC, chemistry, and CT of his abdomen. We will trend his hemoglobin and if there is a drop, will consider obtaining further imaging or admission. [AA]   2337 Patient work-up revealed CBC with acceptable range. His hemoglobin today is 11.4. Not significantly dropped from his baseline 2 months ago. We will get a repeat at the 4 hours chacho for comparison. Pending the results of his CT abdomen. Patient is currently not complaining of any rectal bleeding. He has some epigastric discomfort in which we will give GI cocktail. [AA]   Wed Ponce 15, 2022   0100 CT is negative. Pending repeat H&H. [AA]   0156 HGB(!): 11.6  No changes in his hemoglobin. No evidence of acute bleeding. Patient appears comfortable. He is able to take p.o. Will discharge home to follow-up with gastroenterology. He is given resource establish care. Given that he responded to antacids, I will discharge him with a short course of antacid. He is instructed to call his primary care doctor for that and follow-up. Anticipatory guidance and return precautions discussed with the patient. Stressed on the portal come back to the ED if symptoms worsen or develop new concerns. [AA]      ED Course User Index  [AA] Puma Hobbs MD       Disposition     Disposition: Condition improved  DC- Adult Discharges: All of the diagnostic tests were reviewed and questions answered. Diagnosis, care plan and treatment options were discussed.   The patient understands the instructions and will follow up as directed. The patients results have been reviewed with them. They have been counseled regarding their diagnosis. The patient verbally convey understanding and agreement of the signs, symptoms, diagnosis, treatment and prognosis and additionally agrees to follow up as recommended with their PCP in 24 - 48 hours. They also agree with the care-plan and convey that all of their questions have been answered. I have also put together some discharge instructions for them that include: 1) educational information regarding their diagnosis, 2) how to care for their diagnosis at home, as well a 3) list of reasons why they would want to return to the ED prior to their follow-up appointment, should their condition change. DISCHARGE PLAN:  1. There are no discharge medications for this patient. 2.   Follow-up Information     Follow up With Specialties Details Why [facility] EMERGENCY DEPT Emergency Medicine Go to  As needed, If symptoms worsen [address] [facility] [address] [phone]    Candance Masse, MD Family Medicine Schedule an appointment as soon as possible for a visit  For reevaluation, Discuss your visit to the ER [address]  [place] [address] [phone]      Abril Stallworth MD Gastroenterology Schedule an appointment as soon as possible for a visit in 3 days For reevaluation, D[facility]  [facility]  [address] [facility]  [phone]          3. Return to ED if worse   4. Current Discharge Medication List      START taking these medications    Details   famotidine (PEPCID) 40 mg tablet Take 1 Tablet by mouth nightly. Qty: 10 Tablet, Refills: 0  Start date: 6/15/2022             Diagnosis     Clinical Impression:   1. Abdominal pain, epigastric    2. Hemorrhoids, unspecified hemorrhoid type        Attestations:     Albertina Cantu MD    Please note that this dictation was completed with [employer], the [employer] voice recognition software. Quite often unanticipated grammatical, syntax, homophones, and other interpretive errors are inadvertently transcribed by the computer software. Please disregard these errors. Please excuse any errors that have escaped final proofreading. Thank you.

## 2022-06-15 NOTE — ED TRIAGE NOTES
States he has had right lower abd pain for the last 2 weeks and dark tarry stools that started today.  Denies any anticoags

## 2022-06-20 ENCOUNTER — APPOINTMENT (OUTPATIENT)
Dept: CT IMAGING | Age: 62
End: 2022-06-20
Attending: EMERGENCY MEDICINE
Payer: MEDICARE

## 2022-06-20 ENCOUNTER — HOSPITAL ENCOUNTER (INPATIENT)
Age: 62
LOS: 2 days | Discharge: HOME | DRG: 378 | End: 2022-06-22
Attending: FAMILY MEDICINE | Admitting: INTERNAL MEDICINE
Payer: MEDICARE

## 2022-06-20 ENCOUNTER — HOSPITAL ENCOUNTER (EMERGENCY)
Age: 62
Discharge: ANOTHER HOSPITAL | End: 2022-06-20
Attending: EMERGENCY MEDICINE
Payer: MEDICARE

## 2022-06-20 VITALS
HEART RATE: 91 BPM | DIASTOLIC BLOOD PRESSURE: 66 MMHG | RESPIRATION RATE: 18 BRPM | TEMPERATURE: 97.7 F | BODY MASS INDEX: 20.4 KG/M2 | SYSTOLIC BLOOD PRESSURE: 126 MMHG | OXYGEN SATURATION: 100 % | WEIGHT: 130 LBS | HEIGHT: 67 IN

## 2022-06-20 DIAGNOSIS — K92.2 GASTROINTESTINAL HEMORRHAGE, UNSPECIFIED GASTROINTESTINAL HEMORRHAGE TYPE: Primary | ICD-10-CM

## 2022-06-20 LAB
ABO + RH BLD: NORMAL
ALBUMIN SERPL-MCNC: 2.5 G/DL (ref 3.5–5)
ALBUMIN/GLOB SERPL: 0.8 {RATIO} (ref 1.1–2.2)
ALP SERPL-CCNC: 68 U/L (ref 45–117)
ALT SERPL-CCNC: 17 U/L (ref 12–78)
ANION GAP SERPL CALC-SCNC: 7 MMOL/L (ref 5–15)
AST SERPL W P-5'-P-CCNC: 11 U/L (ref 15–37)
BASOPHILS # BLD: 0.1 K/UL (ref 0–0.2)
BASOPHILS NFR BLD: 1 % (ref 0–2.5)
BILIRUB SERPL-MCNC: 0.1 MG/DL (ref 0.2–1)
BLOOD GROUP ANTIBODIES SERPL: NEGATIVE
BUN SERPL-MCNC: 19 MG/DL (ref 6–20)
BUN/CREAT SERPL: 28 (ref 12–20)
CA-I BLD-MCNC: 8.1 MG/DL (ref 8.5–10.1)
CHLORIDE SERPL-SCNC: 109 MMOL/L (ref 97–108)
CO2 SERPL-SCNC: 25 MMOL/L (ref 21–32)
CREAT SERPL-MCNC: 0.68 MG/DL (ref 0.7–1.3)
EOSINOPHIL # BLD: 0.1 K/UL (ref 0–0.7)
EOSINOPHIL NFR BLD: 1 % (ref 0.9–2.9)
ERYTHROCYTE [DISTWIDTH] IN BLOOD BY AUTOMATED COUNT: 14.4 % (ref 11.5–14.5)
GLOBULIN SER CALC-MCNC: 3 G/DL (ref 2–4)
GLUCOSE SERPL-MCNC: 120 MG/DL (ref 65–100)
HCT VFR BLD AUTO: 17.7 % (ref 41–53)
HGB BLD-MCNC: 5.9 G/DL (ref 13.5–17.5)
INR PPP: 1.1 (ref 0.9–1.1)
LIPASE SERPL-CCNC: 67 U/L (ref 73–393)
LYMPHOCYTES # BLD: 1 K/UL (ref 1–4.8)
LYMPHOCYTES NFR BLD: 9 % (ref 20.5–51.1)
MCH RBC QN AUTO: 32 PG (ref 31–34)
MCHC RBC AUTO-ENTMCNC: 33.2 G/DL (ref 31–36)
MCV RBC AUTO: 96.5 FL (ref 80–100)
MONOCYTES # BLD: 0.6 K/UL (ref 0.2–2.4)
MONOCYTES NFR BLD: 5 % (ref 1.7–9.3)
NEUTS SEG # BLD: 9.4 K/UL (ref 1.8–7.7)
NEUTS SEG NFR BLD: 84 % (ref 42–75)
NRBC # BLD: 0.01 K/UL
NRBC BLD-RTO: 0.1 PER 100 WBC
PLATELET # BLD AUTO: 272 K/UL (ref 150–400)
PMV BLD AUTO: 6.8 FL (ref 6.5–11.5)
POTASSIUM SERPL-SCNC: 4.1 MMOL/L (ref 3.5–5.1)
PROT SERPL-MCNC: 5.5 G/DL (ref 6.4–8.2)
PROTHROMBIN TIME: 13.6 SEC (ref 11.9–14.6)
RBC # BLD AUTO: 1.84 M/UL (ref 4.5–5.9)
SODIUM SERPL-SCNC: 141 MMOL/L (ref 136–145)
SPECIMEN EXP DATE BLD: NORMAL
WBC # BLD AUTO: 11.2 K/UL (ref 4.4–11.3)

## 2022-06-20 PROCEDURE — C9113 INJ PANTOPRAZOLE SODIUM, VIA: HCPCS | Performed by: INTERNAL MEDICINE

## 2022-06-20 PROCEDURE — C9113 INJ PANTOPRAZOLE SODIUM, VIA: HCPCS | Performed by: EMERGENCY MEDICINE

## 2022-06-20 PROCEDURE — 85025 COMPLETE CBC W/AUTO DIFF WBC: CPT

## 2022-06-20 PROCEDURE — 96374 THER/PROPH/DIAG INJ IV PUSH: CPT

## 2022-06-20 PROCEDURE — 74011250636 HC RX REV CODE- 250/636: Performed by: EMERGENCY MEDICINE

## 2022-06-20 PROCEDURE — 74011000250 HC RX REV CODE- 250: Performed by: INTERNAL MEDICINE

## 2022-06-20 PROCEDURE — 99285 EMERGENCY DEPT VISIT HI MDM: CPT

## 2022-06-20 PROCEDURE — 65270000029 HC RM PRIVATE

## 2022-06-20 PROCEDURE — 74011000250 HC RX REV CODE- 250: Performed by: EMERGENCY MEDICINE

## 2022-06-20 PROCEDURE — 74176 CT ABD & PELVIS W/O CONTRAST: CPT

## 2022-06-20 PROCEDURE — 80053 COMPREHEN METABOLIC PANEL: CPT

## 2022-06-20 PROCEDURE — 85610 PROTHROMBIN TIME: CPT

## 2022-06-20 PROCEDURE — 86900 BLOOD TYPING SEROLOGIC ABO: CPT

## 2022-06-20 PROCEDURE — 83690 ASSAY OF LIPASE: CPT

## 2022-06-20 PROCEDURE — 74011250636 HC RX REV CODE- 250/636: Performed by: INTERNAL MEDICINE

## 2022-06-20 RX ORDER — AMLODIPINE BESYLATE 5 MG/1
TABLET ORAL
COMMUNITY
Start: 2022-06-06

## 2022-06-20 RX ORDER — ACETAMINOPHEN 325 MG/1
650 TABLET ORAL
Status: DISCONTINUED | OUTPATIENT
Start: 2022-06-20 | End: 2022-06-21

## 2022-06-20 RX ORDER — SODIUM CHLORIDE, SODIUM LACTATE, POTASSIUM CHLORIDE, CALCIUM CHLORIDE 600; 310; 30; 20 MG/100ML; MG/100ML; MG/100ML; MG/100ML
100 INJECTION, SOLUTION INTRAVENOUS CONTINUOUS
Status: DISCONTINUED | OUTPATIENT
Start: 2022-06-20 | End: 2022-06-22 | Stop reason: HOSPADM

## 2022-06-20 RX ORDER — NABUMETONE 500 MG/1
TABLET, FILM COATED ORAL
COMMUNITY
Start: 2022-06-06

## 2022-06-20 RX ADMIN — SODIUM CHLORIDE, POTASSIUM CHLORIDE, SODIUM LACTATE AND CALCIUM CHLORIDE 100 ML/HR: 600; 310; 30; 20 INJECTION, SOLUTION INTRAVENOUS at 22:46

## 2022-06-20 RX ADMIN — PANTOPRAZOLE SODIUM 40 MG: 40 INJECTION, POWDER, FOR SOLUTION INTRAVENOUS at 17:07

## 2022-06-20 RX ADMIN — PANTOPRAZOLE SODIUM 40 MG: 40 INJECTION, POWDER, FOR SOLUTION INTRAVENOUS at 22:53

## 2022-06-20 NOTE — ED PROVIDER NOTES
HPI 61-year-old male presents to ED complaining of vomiting bright red blood and passing dark red blood mixed with stool beginning last night. Also with mid abdominal pain right sided. No fever. Seen at [facility] [place] 6 days ago with similar complaints hemoglobin 11.1 then, hemoglobin 13.5 in March 2022 and 16 in February 2022. Continue with dark stool since then. Occasional alcohol daily smoker no prior history of GI surgery or GI bleed    Past Medical History:   Diagnosis Date    Arthritis     Hypertension        Past Surgical History:   Procedure Laterality Date    HX ORTHOPAEDIC           History reviewed. No pertinent family history. Social History     Socioeconomic History    Marital status: SINGLE     Spouse name: Not on file    Number of children: Not on file    Years of education: Not on file    Highest education level: Not on file   Occupational History    Not on file   Tobacco Use    Smoking status: Current Some Day Smoker     Packs/day: 0.25    Smokeless tobacco: Never Used   Vaping Use    Vaping Use: Never used   Substance and Sexual Activity    Alcohol use: Yes     Alcohol/week: 3.0 standard drinks     Types: 3 Cans of beer per week     Comment: daily    Drug use: Never    Sexual activity: Not Currently   Other Topics Concern    Not on file   Social History Narrative    Not on file     Social Determinants of Health     Financial Resource Strain:     Difficulty of Paying Living Expenses: Not on file   Food Insecurity:     Worried About Running Out of Food in the Last Year: Not on file    Luis E of Food in the Last Year: Not on file   Transportation Needs:     Lack of Transportation (Medical): Not on file    Lack of Transportation (Non-Medical):  Not on file   Physical Activity:     Days of Exercise per Week: Not on file    Minutes of Exercise per Session: Not on file   Stress:     Feeling of Stress : Not on file   Social Connections:     Frequency of Communication with Friends and Family: Not on file    Frequency of Social Gatherings with Friends and Family: Not on file    Attends Taoist Services: Not on file    Active Member of Clubs or Organizations: Not on file    Attends Club or Organization Meetings: Not on file    Marital Status: Not on file   Intimate Partner Violence:     Fear of Current or Ex-Partner: Not on file    Emotionally Abused: Not on file    Physically Abused: Not on file    Sexually Abused: Not on file   Housing Stability:     Unable to Pay for Housing in the Last Year: Not on file    Number of [place] in the Last Year: Not on file    Unstable Housing in the Last Year: Not on file         ALLERGIES: Patient has no known allergies. Review of Systems   Constitutional: Negative for appetite change, chills, diaphoresis and fever. HENT: Negative for ear pain, facial swelling, sinus pain and trouble swallowing. Eyes: Negative for redness and visual disturbance. Respiratory: Negative for cough, choking, chest tightness, shortness of breath, wheezing and stridor. Cardiovascular: Negative for chest pain, palpitations and leg swelling. Gastrointestinal: Positive for blood in stool. Negative for abdominal distention, abdominal pain, diarrhea, nausea and vomiting. Endocrine: Negative for polydipsia and polyuria. Genitourinary: Negative for difficulty urinating, dysuria, flank pain, frequency, hematuria and urgency. Musculoskeletal: Negative. Allergic/Immunologic: Negative. Neurological: Negative. Psychiatric/Behavioral: Negative. Vitals:    06/20/22 1502   BP: 132/75   Pulse: 100   Resp: 17   Temp: 97.7 °F (36.5 °C)   SpO2: 100%   Weight: 59 kg (130 lb)   Height: 5' 7\" (1.702 m)          Thin AA male appears pale heart rate 100 at rest no immediate distress  Physical Exam  Vitals and nursing note reviewed. Constitutional:       General: He is not in acute distress.      Appearance: Normal appearance. He is not ill-appearing, toxic-appearing or diaphoretic. HENT:      Head: Normocephalic and atraumatic. Nose: Nose normal.      Mouth/Throat:      Mouth: Mucous membranes are moist.   Eyes:      Extraocular Movements: Extraocular movements intact. Conjunctiva/sclera: Conjunctivae normal.   Cardiovascular:      Rate and Rhythm: Normal rate and regular rhythm. Pulses: Normal pulses. Heart sounds: Normal heart sounds. No murmur heard. Pulmonary:      Effort: Pulmonary effort is normal. No respiratory distress. Breath sounds: Normal breath sounds. No wheezing, rhonchi or rales. Abdominal:      General: Bowel sounds are normal. There is no distension. Palpations: Abdomen is soft. There is no mass. Tenderness: There is no abdominal tenderness. There is no right CVA tenderness, left CVA tenderness, guarding or rebound. Hernia: No hernia is present. Musculoskeletal:         General: No swelling, tenderness, deformity or signs of injury. Normal range of motion. Cervical back: Normal range of motion and neck supple. No rigidity or tenderness. Right lower leg: No edema. Left lower leg: No edema. Lymphadenopathy:      Cervical: No cervical adenopathy. Skin:     General: Skin is warm and dry. Capillary Refill: Capillary refill takes less than 2 seconds. Findings: No erythema, lesion or rash. Neurological:      General: No focal deficit present. Mental Status: He is alert and oriented to person, place, and time. Mental status is at baseline. Cranial Nerves: No cranial nerve deficit. Sensory: No sensory deficit. Psychiatric:         Mood and Affect: Mood normal.         Behavior: Behavior normal.         Thought Content: Thought content normal.          MDM  Differential diagnosis of GI bleed most likely upper source ulcer gastritis Adele-Jeffers tear malignancy AVM.     Hemoglobin now 5.9 will need ICU admission and GI evaluation plan for transfer to [address] [facility] care note 30 minutes presented with upper GI bleeding also with melena hemoglobin 5.9 mildly tachycardic blood pressure 132/75-type and screen ordered awaiting callback from [address] [place], [place] [address]ist [address] [facility] discussed history exam significant GI bleed with hemoglobin now 5.9 plan for transfer and hospitalization

## 2022-06-20 NOTE — PROGRESS NOTES
TRANSFER - IN REPORT:    Verbal report received from Berta(name) on Alonza Lieu  being received from [facility]) for routine progression of care      Report consisted of patients Situation, Background, Assessment and   Recommendations(SBAR). Information from the following report(s) SBAR, Kardex, [facility] and Recent Results was reviewed with the receiving nurse. Opportunity for questions and clarification was provided. Assessment completed upon patients arrival to unit and care assumed.

## 2022-06-20 NOTE — ED TRIAGE NOTES
Pt reports bloody stool and vomitng. Per EMS, pt was vomiting clots. Pt was seen at [facility] for same last Thursday when symptoms began. Pt denies abd pain.

## 2022-06-21 ENCOUNTER — APPOINTMENT (OUTPATIENT)
Dept: GENERAL RADIOLOGY | Age: 62
DRG: 378 | End: 2022-06-21
Attending: INTERNAL MEDICINE
Payer: MEDICARE

## 2022-06-21 ENCOUNTER — ANESTHESIA (OUTPATIENT)
Dept: ENDOSCOPY | Age: 62
DRG: 378 | End: 2022-06-21
Payer: MEDICARE

## 2022-06-21 ENCOUNTER — ANESTHESIA EVENT (OUTPATIENT)
Dept: ENDOSCOPY | Age: 62
DRG: 378 | End: 2022-06-21
Payer: MEDICARE

## 2022-06-21 LAB
AMPHET UR QL SCN: NEGATIVE
ANION GAP SERPL CALC-SCNC: 6 MMOL/L (ref 5–15)
APPEARANCE UR: CLEAR
BACTERIA URNS QL MICRO: ABNORMAL /HPF
BARBITURATES UR QL SCN: NEGATIVE
BASOPHILS # BLD: 0.1 K/UL (ref 0–0.1)
BASOPHILS NFR BLD: 1 % (ref 0–1)
BENZODIAZ UR QL: NEGATIVE
BILIRUB UR QL: NEGATIVE
BUN SERPL-MCNC: 16 MG/DL (ref 6–20)
BUN/CREAT SERPL: 30 (ref 12–20)
CALCIUM SERPL-MCNC: 8.3 MG/DL (ref 8.5–10.1)
CANNABINOIDS UR QL SCN: NEGATIVE
CHLORIDE SERPL-SCNC: 113 MMOL/L (ref 97–108)
CO2 SERPL-SCNC: 23 MMOL/L (ref 21–32)
COCAINE UR QL SCN: POSITIVE
COLOR UR: ABNORMAL
COMMENT, HOLDF: NORMAL
CREAT SERPL-MCNC: 0.54 MG/DL (ref 0.7–1.3)
DIFFERENTIAL METHOD BLD: ABNORMAL
DRUG SCRN COMMENT,DRGCM: ABNORMAL
EOSINOPHIL # BLD: 0.1 K/UL (ref 0–0.4)
EOSINOPHIL NFR BLD: 2 % (ref 0–7)
EPITH CASTS URNS QL MICRO: ABNORMAL /LPF
ERYTHROCYTE [DISTWIDTH] IN BLOOD BY AUTOMATED COUNT: 14.7 % (ref 11.5–14.5)
FERRITIN SERPL-MCNC: 46 NG/ML (ref 26–388)
FOLATE SERPL-MCNC: 10.6 NG/ML (ref 5–21)
GLUCOSE SERPL-MCNC: 83 MG/DL (ref 65–100)
GLUCOSE UR STRIP.AUTO-MCNC: NEGATIVE MG/DL
HCT VFR BLD AUTO: 15.4 % (ref 36.6–50.3)
HCT VFR BLD AUTO: 22.9 % (ref 36.6–50.3)
HGB BLD-MCNC: 5 G/DL (ref 12.1–17)
HGB BLD-MCNC: 7.8 G/DL (ref 12.1–17)
HGB UR QL STRIP: NEGATIVE
HISTORY CHECKED?,CKHIST: NORMAL
IMM GRANULOCYTES # BLD AUTO: 0 K/UL (ref 0–0.04)
IMM GRANULOCYTES NFR BLD AUTO: 0 % (ref 0–0.5)
IRON SATN MFR SERPL: 8 % (ref 20–50)
IRON SERPL-MCNC: 23 UG/DL (ref 35–150)
KETONES UR QL STRIP.AUTO: ABNORMAL MG/DL
LEUKOCYTE ESTERASE UR QL STRIP.AUTO: ABNORMAL
LIPASE SERPL-CCNC: 97 U/L (ref 73–393)
LYMPHOCYTES # BLD: 2.2 K/UL (ref 0.8–3.5)
LYMPHOCYTES NFR BLD: 33 % (ref 12–49)
MAGNESIUM SERPL-MCNC: 2.4 MG/DL (ref 1.6–2.4)
MCH RBC QN AUTO: 32.5 PG (ref 26–34)
MCHC RBC AUTO-ENTMCNC: 32.5 G/DL (ref 30–36.5)
MCV RBC AUTO: 100 FL (ref 80–99)
METHADONE UR QL: NEGATIVE
MONOCYTES # BLD: 0.5 K/UL (ref 0–1)
MONOCYTES NFR BLD: 7 % (ref 5–13)
MUCOUS THREADS URNS QL MICRO: ABNORMAL /LPF
NEUTS SEG # BLD: 3.7 K/UL (ref 1.8–8)
NEUTS SEG NFR BLD: 57 % (ref 32–75)
NITRITE UR QL STRIP.AUTO: NEGATIVE
NRBC # BLD: 0 K/UL (ref 0–0.01)
NRBC BLD-RTO: 0 PER 100 WBC
OPIATES UR QL: NEGATIVE
PCP UR QL: NEGATIVE
PH UR STRIP: 5 [PH] (ref 5–8)
PHOSPHATE SERPL-MCNC: 3.1 MG/DL (ref 2.6–4.7)
PLATELET # BLD AUTO: 244 K/UL (ref 150–400)
PMV BLD AUTO: 9.5 FL (ref 8.9–12.9)
POTASSIUM SERPL-SCNC: 3.9 MMOL/L (ref 3.5–5.1)
PROT UR STRIP-MCNC: NEGATIVE MG/DL
RBC # BLD AUTO: 1.54 M/UL (ref 4.1–5.7)
RBC #/AREA URNS HPF: ABNORMAL /HPF (ref 0–5)
RBC MORPH BLD: ABNORMAL
SAMPLES BEING HELD,HOLD: NORMAL
SODIUM SERPL-SCNC: 142 MMOL/L (ref 136–145)
SP GR UR REFRACTOMETRY: 1.02 (ref 1–1.03)
TIBC SERPL-MCNC: 294 UG/DL (ref 250–450)
TROPONIN-HIGH SENSITIVITY: 6 NG/L (ref 0–76)
TSH SERPL DL<=0.05 MIU/L-ACNC: 2.96 UIU/ML (ref 0.36–3.74)
UROBILINOGEN UR QL STRIP.AUTO: 0.2 EU/DL (ref 0.2–1)
VIT B12 SERPL-MCNC: 223 PG/ML (ref 193–986)
WBC # BLD AUTO: 6.6 K/UL (ref 4.1–11.1)
WBC URNS QL MICRO: ABNORMAL /HPF (ref 0–4)

## 2022-06-21 PROCEDURE — 77030008684 HC TU ET CUF COVD -B: Performed by: STUDENT IN AN ORGANIZED HEALTH CARE EDUCATION/TRAINING PROGRAM

## 2022-06-21 PROCEDURE — 74011250637 HC RX REV CODE- 250/637: Performed by: INTERNAL MEDICINE

## 2022-06-21 PROCEDURE — 2709999900 HC NON-CHARGEABLE SUPPLY: Performed by: INTERNAL MEDICINE

## 2022-06-21 PROCEDURE — 74011000250 HC RX REV CODE- 250: Performed by: NURSE PRACTITIONER

## 2022-06-21 PROCEDURE — 84484 ASSAY OF TROPONIN QUANT: CPT

## 2022-06-21 PROCEDURE — 74011250636 HC RX REV CODE- 250/636: Performed by: NURSE PRACTITIONER

## 2022-06-21 PROCEDURE — 71045 X-RAY EXAM CHEST 1 VIEW: CPT

## 2022-06-21 PROCEDURE — 83735 ASSAY OF MAGNESIUM: CPT

## 2022-06-21 PROCEDURE — C9113 INJ PANTOPRAZOLE SODIUM, VIA: HCPCS | Performed by: INTERNAL MEDICINE

## 2022-06-21 PROCEDURE — 0DB68ZX EXCISION OF STOMACH, VIA NATURAL OR ARTIFICIAL OPENING ENDOSCOPIC, DIAGNOSTIC: ICD-10-PCS | Performed by: INTERNAL MEDICINE

## 2022-06-21 PROCEDURE — 65270000029 HC RM PRIVATE

## 2022-06-21 PROCEDURE — 85025 COMPLETE CBC W/AUTO DIFF WBC: CPT

## 2022-06-21 PROCEDURE — 76060000031 HC ANESTHESIA FIRST 0.5 HR: Performed by: INTERNAL MEDICINE

## 2022-06-21 PROCEDURE — P9016 RBC LEUKOCYTES REDUCED: HCPCS

## 2022-06-21 PROCEDURE — 84443 ASSAY THYROID STIM HORMONE: CPT

## 2022-06-21 PROCEDURE — 36430 TRANSFUSION BLD/BLD COMPNT: CPT

## 2022-06-21 PROCEDURE — 36415 COLL VENOUS BLD VENIPUNCTURE: CPT

## 2022-06-21 PROCEDURE — 30233N1 TRANSFUSION OF NONAUTOLOGOUS RED BLOOD CELLS INTO PERIPHERAL VEIN, PERCUTANEOUS APPROACH: ICD-10-PCS | Performed by: STUDENT IN AN ORGANIZED HEALTH CARE EDUCATION/TRAINING PROGRAM

## 2022-06-21 PROCEDURE — 80048 BASIC METABOLIC PNL TOTAL CA: CPT

## 2022-06-21 PROCEDURE — 85018 HEMOGLOBIN: CPT

## 2022-06-21 PROCEDURE — 81001 URINALYSIS AUTO W/SCOPE: CPT

## 2022-06-21 PROCEDURE — 82746 ASSAY OF FOLIC ACID SERUM: CPT

## 2022-06-21 PROCEDURE — 86900 BLOOD TYPING SEROLOGIC ABO: CPT

## 2022-06-21 PROCEDURE — 76040000019: Performed by: INTERNAL MEDICINE

## 2022-06-21 PROCEDURE — 80307 DRUG TEST PRSMV CHEM ANLYZR: CPT

## 2022-06-21 PROCEDURE — 84100 ASSAY OF PHOSPHORUS: CPT

## 2022-06-21 PROCEDURE — 74011250636 HC RX REV CODE- 250/636: Performed by: INTERNAL MEDICINE

## 2022-06-21 PROCEDURE — 74011000250 HC RX REV CODE- 250: Performed by: INTERNAL MEDICINE

## 2022-06-21 PROCEDURE — 77030021593 HC FCPS BIOP ENDOSC BSC -A: Performed by: INTERNAL MEDICINE

## 2022-06-21 PROCEDURE — 83690 ASSAY OF LIPASE: CPT

## 2022-06-21 PROCEDURE — 77030026438 HC STYL ET INTUB CARD -A: Performed by: STUDENT IN AN ORGANIZED HEALTH CARE EDUCATION/TRAINING PROGRAM

## 2022-06-21 PROCEDURE — 86923 COMPATIBILITY TEST ELECTRIC: CPT

## 2022-06-21 PROCEDURE — 83540 ASSAY OF IRON: CPT

## 2022-06-21 PROCEDURE — 82607 VITAMIN B-12: CPT

## 2022-06-21 PROCEDURE — 82728 ASSAY OF FERRITIN: CPT

## 2022-06-21 RX ORDER — IBUPROFEN 200 MG
1 TABLET ORAL DAILY
Status: DISCONTINUED | OUTPATIENT
Start: 2022-06-21 | End: 2022-06-22 | Stop reason: HOSPADM

## 2022-06-21 RX ORDER — SODIUM CHLORIDE 9 MG/ML
75 INJECTION, SOLUTION INTRAVENOUS CONTINUOUS
Status: DISPENSED | OUTPATIENT
Start: 2022-06-21 | End: 2022-06-21

## 2022-06-21 RX ORDER — ONDANSETRON 2 MG/ML
4 INJECTION INTRAMUSCULAR; INTRAVENOUS
Status: DISCONTINUED | OUTPATIENT
Start: 2022-06-21 | End: 2022-06-22 | Stop reason: HOSPADM

## 2022-06-21 RX ORDER — AMLODIPINE BESYLATE 5 MG/1
5 TABLET ORAL DAILY
Status: DISCONTINUED | OUTPATIENT
Start: 2022-06-21 | End: 2022-06-22 | Stop reason: HOSPADM

## 2022-06-21 RX ORDER — ACETAMINOPHEN 650 MG/1
650 SUPPOSITORY RECTAL
Status: DISCONTINUED | OUTPATIENT
Start: 2022-06-21 | End: 2022-06-22 | Stop reason: HOSPADM

## 2022-06-21 RX ORDER — SODIUM CHLORIDE 0.9 % (FLUSH) 0.9 %
5-40 SYRINGE (ML) INJECTION AS NEEDED
Status: DISCONTINUED | OUTPATIENT
Start: 2022-06-21 | End: 2022-06-22 | Stop reason: HOSPADM

## 2022-06-21 RX ORDER — DEXAMETHASONE SODIUM PHOSPHATE 4 MG/ML
INJECTION, SOLUTION INTRA-ARTICULAR; INTRALESIONAL; INTRAMUSCULAR; INTRAVENOUS; SOFT TISSUE AS NEEDED
Status: DISCONTINUED | OUTPATIENT
Start: 2022-06-21 | End: 2022-06-21 | Stop reason: HOSPADM

## 2022-06-21 RX ORDER — MIDAZOLAM HYDROCHLORIDE 1 MG/ML
.25-5 INJECTION, SOLUTION INTRAMUSCULAR; INTRAVENOUS
Status: DISCONTINUED | OUTPATIENT
Start: 2022-06-21 | End: 2022-06-21 | Stop reason: HOSPADM

## 2022-06-21 RX ORDER — ACETAMINOPHEN 325 MG/1
650 TABLET ORAL
Status: DISCONTINUED | OUTPATIENT
Start: 2022-06-21 | End: 2022-06-22 | Stop reason: HOSPADM

## 2022-06-21 RX ORDER — SODIUM CHLORIDE, SODIUM LACTATE, POTASSIUM CHLORIDE, CALCIUM CHLORIDE 600; 310; 30; 20 MG/100ML; MG/100ML; MG/100ML; MG/100ML
INJECTION, SOLUTION INTRAVENOUS
Status: DISCONTINUED | OUTPATIENT
Start: 2022-06-21 | End: 2022-06-21 | Stop reason: HOSPADM

## 2022-06-21 RX ORDER — GABAPENTIN 300 MG/1
CAPSULE ORAL
COMMUNITY
Start: 2022-04-04

## 2022-06-21 RX ORDER — ONDANSETRON 4 MG/1
4 TABLET, ORALLY DISINTEGRATING ORAL
Status: DISCONTINUED | OUTPATIENT
Start: 2022-06-21 | End: 2022-06-22 | Stop reason: HOSPADM

## 2022-06-21 RX ORDER — SODIUM CHLORIDE 0.9 % (FLUSH) 0.9 %
5-40 SYRINGE (ML) INJECTION EVERY 8 HOURS
Status: DISCONTINUED | OUTPATIENT
Start: 2022-06-21 | End: 2022-06-22 | Stop reason: HOSPADM

## 2022-06-21 RX ORDER — FENTANYL CITRATE 50 UG/ML
12.5-2 INJECTION, SOLUTION INTRAMUSCULAR; INTRAVENOUS
Status: DISCONTINUED | OUTPATIENT
Start: 2022-06-21 | End: 2022-06-21 | Stop reason: HOSPADM

## 2022-06-21 RX ORDER — POLYETHYLENE GLYCOL 3350 17 G/17G
17 POWDER, FOR SOLUTION ORAL DAILY PRN
Status: DISCONTINUED | OUTPATIENT
Start: 2022-06-21 | End: 2022-06-22 | Stop reason: HOSPADM

## 2022-06-21 RX ORDER — ONDANSETRON 2 MG/ML
INJECTION INTRAMUSCULAR; INTRAVENOUS AS NEEDED
Status: DISCONTINUED | OUTPATIENT
Start: 2022-06-21 | End: 2022-06-21 | Stop reason: HOSPADM

## 2022-06-21 RX ORDER — BETAMETHASONE SODIUM PHOSPHATE AND BETAMETHASONE ACETATE 3; 3 MG/ML; MG/ML
6 INJECTION, SUSPENSION INTRA-ARTICULAR; INTRALESIONAL; INTRAMUSCULAR; SOFT TISSUE ONCE
COMMUNITY
Start: 2022-04-01

## 2022-06-21 RX ORDER — ATROPINE SULFATE 0.1 MG/ML
0.5 INJECTION INTRAVENOUS
Status: DISCONTINUED | OUTPATIENT
Start: 2022-06-21 | End: 2022-06-21 | Stop reason: HOSPADM

## 2022-06-21 RX ORDER — EPINEPHRINE 0.1 MG/ML
1 INJECTION INTRACARDIAC; INTRAVENOUS
Status: DISCONTINUED | OUTPATIENT
Start: 2022-06-21 | End: 2022-06-21 | Stop reason: HOSPADM

## 2022-06-21 RX ORDER — FLUMAZENIL 0.1 MG/ML
0.2 INJECTION INTRAVENOUS
Status: DISCONTINUED | OUTPATIENT
Start: 2022-06-21 | End: 2022-06-21 | Stop reason: HOSPADM

## 2022-06-21 RX ORDER — ROCURONIUM BROMIDE 10 MG/ML
INJECTION, SOLUTION INTRAVENOUS AS NEEDED
Status: DISCONTINUED | OUTPATIENT
Start: 2022-06-21 | End: 2022-06-21 | Stop reason: HOSPADM

## 2022-06-21 RX ORDER — PROPOFOL 10 MG/ML
INJECTION, EMULSION INTRAVENOUS AS NEEDED
Status: DISCONTINUED | OUTPATIENT
Start: 2022-06-21 | End: 2022-06-21 | Stop reason: HOSPADM

## 2022-06-21 RX ORDER — NALOXONE HYDROCHLORIDE 0.4 MG/ML
0.4 INJECTION, SOLUTION INTRAMUSCULAR; INTRAVENOUS; SUBCUTANEOUS
Status: DISCONTINUED | OUTPATIENT
Start: 2022-06-21 | End: 2022-06-21 | Stop reason: HOSPADM

## 2022-06-21 RX ORDER — SUCCINYLCHOLINE CHLORIDE 20 MG/ML
INJECTION INTRAMUSCULAR; INTRAVENOUS AS NEEDED
Status: DISCONTINUED | OUTPATIENT
Start: 2022-06-21 | End: 2022-06-21 | Stop reason: HOSPADM

## 2022-06-21 RX ORDER — SODIUM CHLORIDE 9 MG/ML
250 INJECTION, SOLUTION INTRAVENOUS AS NEEDED
Status: DISCONTINUED | OUTPATIENT
Start: 2022-06-21 | End: 2022-06-22 | Stop reason: HOSPADM

## 2022-06-21 RX ORDER — LIDOCAINE HYDROCHLORIDE 20 MG/ML
INJECTION, SOLUTION EPIDURAL; INFILTRATION; INTRACAUDAL; PERINEURAL AS NEEDED
Status: DISCONTINUED | OUTPATIENT
Start: 2022-06-21 | End: 2022-06-21 | Stop reason: HOSPADM

## 2022-06-21 RX ORDER — DEXTROMETHORPHAN/PSEUDOEPHED 2.5-7.5/.8
1.2 DROPS ORAL
Status: DISCONTINUED | OUTPATIENT
Start: 2022-06-21 | End: 2022-06-21 | Stop reason: HOSPADM

## 2022-06-21 RX ADMIN — ROCURONIUM BROMIDE 5 MG: 10 SOLUTION INTRAVENOUS at 16:52

## 2022-06-21 RX ADMIN — DEXAMETHASONE SODIUM PHOSPHATE 4 MG: 4 INJECTION, SOLUTION INTRAMUSCULAR; INTRAVENOUS at 16:54

## 2022-06-21 RX ADMIN — SODIUM CHLORIDE, PRESERVATIVE FREE 10 ML: 5 INJECTION INTRAVENOUS at 22:28

## 2022-06-21 RX ADMIN — LIDOCAINE HYDROCHLORIDE 80 MG: 20 INJECTION, SOLUTION EPIDURAL; INFILTRATION; INTRACAUDAL; PERINEURAL at 16:52

## 2022-06-21 RX ADMIN — SUCCINYLCHOLINE CHLORIDE 180 MG: 20 INJECTION, SOLUTION INTRAMUSCULAR; INTRAVENOUS at 16:52

## 2022-06-21 RX ADMIN — PROPOFOL 150 MG: 10 INJECTION, EMULSION INTRAVENOUS at 16:52

## 2022-06-21 RX ADMIN — ONDANSETRON HYDROCHLORIDE 4 MG: 2 INJECTION, SOLUTION INTRAMUSCULAR; INTRAVENOUS at 16:54

## 2022-06-21 RX ADMIN — PANTOPRAZOLE SODIUM 40 MG: 40 INJECTION, POWDER, FOR SOLUTION INTRAVENOUS at 09:37

## 2022-06-21 RX ADMIN — AMLODIPINE BESYLATE 5 MG: 5 TABLET ORAL at 09:37

## 2022-06-21 RX ADMIN — SODIUM CHLORIDE, PRESERVATIVE FREE 10 ML: 5 INJECTION INTRAVENOUS at 06:39

## 2022-06-21 RX ADMIN — SODIUM CHLORIDE, POTASSIUM CHLORIDE, SODIUM LACTATE AND CALCIUM CHLORIDE: 600; 310; 30; 20 INJECTION, SOLUTION INTRAVENOUS at 16:49

## 2022-06-21 RX ADMIN — PANTOPRAZOLE SODIUM 40 MG: 40 INJECTION, POWDER, FOR SOLUTION INTRAVENOUS at 22:27

## 2022-06-21 NOTE — PROGRESS NOTES
Bedside and Verbal shift change report given to Cassie RN (oncoming nurse) by Carmenza Ramos RN (offgoing nurse). Report included the following information SBAR, Kardex, MAR and Recent Results.

## 2022-06-21 NOTE — PERIOP NOTES

## 2022-06-21 NOTE — PROGRESS NOTES
[address] [facility] Adult  Hospitalist Group                                                                                          Hospitalist Progress Note  Rico Ward MD  Answering service: [phone] OR [phone] from in house phone        Date of Service:  2022  NAME:  Alexandru Rasmussen  :  1960  MRN:  [id number]      Admission Summary:   HPI: \"This is a 81-year-old man with past medical history significant for hypertension who was in his usual state of health until the day of his presentation at the emergency room when the patient started passing dark stool. The patient also stated that he vomited bright red blood once. He also complained of abdominal pain. The pain is located at the right side of the abdomen, constant, sharp pain with no radiation, no known aggravating or relieving factors, 6/10 in severity. The patient went to [facility] for further evaluation. When the patient arrived at the emergency room, the patient was found to have a hemoglobin level of 5.9 which was quite a significant drop from the patient's prior hemoglobin. The hospitalist service was asked to directly admit the patient to the hospital for further evaluation and treatment. The patient denies history of GI bleed. The patient has not had colonoscopy done. He also denies using over-the-counter nonsteroidal anti-inflammatory drugs. No fever, no rigors, no chills. No record of prior admission to this hospital.\"          Interval history / Subjective:   Patient seen and examined at bedside earlier. No complaints, no bm yet. Hgb 5 this am, transfused 2 U PRBC.  BP stable      Assessment & Plan:      GI bleed  Acute blood loss anemia  -ppi BID  -transfuse 2u prbc , cont to follow  -transfuse for hgb less than 7  -GI eval and following, plan for EGD today     HTN cont home med    Cocaine mis-use  Tobacco abuse   -counceled to quit           Code status: full Prophylaxis: scd  Care Plan discussed with: patient/fam, nurse, cm   Anticipated Disposition: >48 hrs      Hospital Problems  Date Reviewed: 6/21/2022          Codes Class Noted POA    * (Principal) Acute upper GI bleed ICD-10-CM: K92.2  ICD-9-CM: 578.9  6/20/2022 Yes                Review of Systems:   A comprehensive review of systems was negative except for that written in the HPI. Vital Signs:    Last 24hrs VS reviewed since prior progress note. Most recent are:  Visit Vitals  /70 (BP 1 Location: Right upper arm, BP Patient Position: At rest)   Pulse 81   Temp 98.2 °F (36.8 °C)   Resp 16   SpO2 97%         Intake/Output Summary (Last 24 hours) at 6/21/2022 1527  Last data filed at 6/21/2022 1344  Gross per 24 hour   Intake 967.13 ml   Output --   Net 967.13 ml        Physical Examination:     I had a face to face encounter with this patient and independently examined them on 6/21/2022 as outlined below:          Constitutional:  No acute distress, cooperative, pleasant    ENT:  Oral mucosa moist, oropharynx benign. Resp:  CTA bilaterally. No wheezing/rhonchi/rales. No accessory muscle use. CV:  Regular rhythm, normal rate, no murmurs, gallops, rubs    GI:  Soft, non distended, non tender. normoactive bowel sounds, no hepatosplenomegaly     Musculoskeletal:  No edema, warm, 2+ pulses throughout    Neurologic:  Moves all extremities.   AAOx3, CN II-XII reviewed            Data Review:    Review and/or order of clinical lab test  Review and/or order of tests in the radiology section of CPT  Review and/or order of tests in the medicine section of CPT      Labs:     Recent Labs     06/21/22 0248 06/20/22  1515   WBC 6.6 11.2   HGB 5.0* 5.9*   HCT 15.4* 17.7*    272     Recent Labs     06/21/22 0248 06/20/22  1515    141   K 3.9 4.1   * 109*   CO2 23 25   BUN 16 19   CREA 0.54* 0.68*   GLU 83 120*   CA 8.3* 8.1*   MG 2.4  --    PHOS 3.1  --      Recent Labs     06/21/22 0248 06/20/22  1515   ALT  --  17   AP  --  68   TBILI  --  0.1*   TP  --  5.5*   ALB  --  2.5*   GLOB  --  3.0   LPSE 97 67*     Recent Labs     06/20/22  1515   INR 1.1   PTP 13.6      Recent Labs     06/21/22  0248   TIBC 294   PSAT 8*   FERR 46      Lab Results   Component Value Date/Time    Folate 10.6 06/21/2022 02:48 AM      No results for input(s): PH, PCO2, PO2 in the last 72 hours. No results for input(s): CPK, CKNDX, TROIQ in the last 72 hours.     No lab exists for component: CPKMB  No results found for: CHOL, CHOLX, CHLST, CHOLV, HDL, HDLP, LDL, LDLC, DLDLP, TGLX, TRIGL, TRIGP, CHHD, CHHDX  No results found for: [facility]  Lab Results   Component Value Date/Time    Color YELLOW/STRAW 06/21/2022 07:24 AM    Appearance CLEAR 06/21/2022 07:24 AM    Specific gravity 1.023 06/21/2022 07:24 AM    Specific gravity 1.027 03/25/2022 02:49 PM    pH (UA) 5.0 06/21/2022 07:24 AM    Protein Negative 06/21/2022 07:24 AM    Glucose Negative 06/21/2022 07:24 AM    Ketone TRACE (A) 06/21/2022 07:24 AM    Bilirubin Negative 06/21/2022 07:24 AM    Urobilinogen 0.2 06/21/2022 07:24 AM    Nitrites Negative 06/21/2022 07:24 AM    Leukocyte Esterase MODERATE (A) 06/21/2022 07:24 AM    Epithelial cells FEW 06/21/2022 07:24 AM    Bacteria 1+ (A) 06/21/2022 07:24 AM    WBC 20-50 06/21/2022 07:24 AM    RBC 0-5 06/21/2022 07:24 AM         Medications Reviewed:     Current Facility-Administered Medications   Medication Dose Route Frequency    0.9% sodium chloride infusion 250 mL  250 mL IntraVENous PRN    amLODIPine (NORVASC) tablet 5 mg  5 mg Oral DAILY    sodium chloride (NS) flush 5-40 mL  5-40 mL IntraVENous Q8H    sodium chloride (NS) flush 5-40 mL  5-40 mL IntraVENous PRN    acetaminophen (TYLENOL) tablet 650 mg  650 mg Oral Q6H PRN    Or    acetaminophen (TYLENOL) suppository 650 mg  650 mg Rectal Q6H PRN    polyethylene glycol (MIRALAX) packet 17 g  17 g Oral DAILY PRN    ondansetron (ZOFRAN ODT) tablet 4 mg  4 mg Oral Q8H PRN    Or    ondansetron (ZOFRAN) injection 4 mg  4 mg IntraVENous Q6H PRN    nicotine (NICODERM CQ) 21 mg/24 hr patch 1 Patch  1 Patch TransDERmal DAILY    pantoprazole (PROTONIX) 40 mg in 0.9% sodium chloride 10 mL injection  40 mg IntraVENous Q12H    lactated Ringers infusion  100 mL/hr IntraVENous CONTINUOUS     ______________________________________________________________________  EXPECTED LENGTH OF STAY: - - -  ACTUAL LENGTH OF STAY:          1                 Amos Leavitt MD

## 2022-06-21 NOTE — ANESTHESIA PREPROCEDURE EVALUATION
Relevant Problems   No relevant active problems       Anesthetic History   No history of anesthetic complications            Review of Systems / Medical History  Patient summary reviewed, nursing notes reviewed and pertinent labs reviewed    Pulmonary          Smoker         Neuro/Psych   Within defined limits           Cardiovascular    Hypertension                   GI/Hepatic/Renal  Within defined limits              Endo/Other        Arthritis     Other Findings              Physical Exam    Airway  Mallampati: II  TM Distance: 4 - 6 cm  Neck ROM: normal range of motion   Mouth opening: Normal     Cardiovascular    Rhythm: regular  Rate: normal         Dental  No notable dental hx       Pulmonary  Breath sounds clear to auscultation               Abdominal  GI exam deferred       Other Findings            Anesthetic Plan    ASA: 4, emergent  Anesthesia type: general          Induction: Intravenous  Anesthetic plan and risks discussed with: Patient

## 2022-06-21 NOTE — PROGRESS NOTES
Problem: Falls - Risk of  Goal: *Absence of Falls  Description: Document Coco Betancourt Fall Risk and appropriate interventions in the flowsheet.   Outcome: Progressing Towards Goal  Note: Fall Risk Interventions:            Medication Interventions: Evaluate medications/consider consulting pharmacy,Patient to call before getting OOB

## 2022-06-21 NOTE — PROGRESS NOTES
TRANSFER - IN REPORT:    Verbal report received from Katia(name) on Allie Wadsworth  being received from [address] [place] [address](unit) for ordered procedure      Report consisted of patients Situation, Background, Assessment and   Recommendations(SBAR). Information from the following report(s) SBAR was reviewed with the receiving nurse. Opportunity for questions and clarification was provided. Assessment completed upon patients arrival to unit and care assumed.

## 2022-06-21 NOTE — CONSULTS
[address] [facility].   [address] [facility] [address]        GASTROENTEROLOGY CONSULTATION NOTE  Will Nando Felix  [phone] office  [phone] NP/PA in-hospital cell phone M-F until 4:30PM  After 5PM or on weekends, please call  for physician on call        NAME:  Ev Becker   :   1960   MRN:   [id number]       Referring Physician: Dr. Myron Martinez Date: 2022 10:46 AM    Chief Complaint: black stools     History of Present Illness:  Patient is a 62 y.o. who is seen in consultation at the request of Dr. Carissa Gutierres for upper GI bleed. Patient has a past medical history significant for hypertension. He presented to the ED at [facility] for evaluation of melena. In the ED, hemoglobin was 5.9. Patient was transferred to [facility] for admission on 22 for acute upper GI bleed. Patient reports multiple episodes of melena since last Thursday. No hematochezia. He reports 1 episode of hematemesis on . No further vomiting. No nausea, reflux, or abdominal pain. No fevers, chills, or unexplained weight loss. No bowel movement since presentation. No history of GI bleed. No known history of liver disease.    + NSAID use (Aleve 1 tab 5 days per week). No anticoagulation. No alcohol use.  + Tobacco use. No history of EGD or colonoscopy. No family history of colon cancer. I have reviewed the emergency room note, hospital admission note, notes by all other clinicians who have seen the patient during this hospitalization to date. I have reviewed the problem list and the reason for this hospitalization. I have reviewed the allergies and the medications the patient was taking at home prior to this hospitalization.     PMH:  Past Medical History:   Diagnosis Date    Arthritis     Hypertension        PSH:  Past Surgical History:   Procedure Laterality Date    HX ORTHOPAEDIC         Allergies:  No Known Allergies    Home Medications:  Prior to Admission Medications   Prescriptions Last Dose Informant Patient Reported? Taking? amLODIPine (NORVASC) 5 mg tablet   Yes No   betamethasone (CELESTONE) 6 mg/mL injection   Yes Yes   Si mg by Intra artICUlar route once. famotidine (PEPCID) 40 mg tablet   No No   Sig: Take 1 Tablet by mouth nightly.   gabapentin (NEURONTIN) 300 mg capsule   Yes Yes   Si cap po at bedtime x 7 days, then 1 cap po bid x 7 days, then tid thereafter, #90 cap   gabapentin (NEURONTIN) 300 mg capsule   Yes No   nabumetone (RELAFEN) 500 mg tablet   Yes No      Facility-Administered Medications: None       Hospital Medications:  Current Facility-Administered Medications   Medication Dose Route Frequency    0.9% sodium chloride infusion 250 mL  250 mL IntraVENous PRN    amLODIPine (NORVASC) tablet 5 mg  5 mg Oral DAILY    sodium chloride (NS) flush 5-40 mL  5-40 mL IntraVENous Q8H    sodium chloride (NS) flush 5-40 mL  5-40 mL IntraVENous PRN    acetaminophen (TYLENOL) tablet 650 mg  650 mg Oral Q6H PRN    Or    acetaminophen (TYLENOL) suppository 650 mg  650 mg Rectal Q6H PRN    polyethylene glycol (MIRALAX) packet 17 g  17 g Oral DAILY PRN    ondansetron (ZOFRAN ODT) tablet 4 mg  4 mg Oral Q8H PRN    Or    ondansetron (ZOFRAN) injection 4 mg  4 mg IntraVENous Q6H PRN    nicotine (NICODERM CQ) 21 mg/24 hr patch 1 Patch  1 Patch TransDERmal DAILY    pantoprazole (PROTONIX) 40 mg in 0.9% sodium chloride 10 mL injection  40 mg IntraVENous Q12H    lactated Ringers infusion  100 mL/hr IntraVENous CONTINUOUS       Social History:  Social History     Tobacco Use    Smoking status: Current Some Day Smoker     Packs/day: 0.25    Smokeless tobacco: Never Used   Substance Use Topics    Alcohol use: Yes     Alcohol/week: 3.0 standard drinks     Types: 3 Cans of beer per week     Comment: daily     Family History:  No family history on file.     Review of Systems:  Constitutional: negative fever, negative chills, negative weight loss  Eyes:   negative visual changes  ENT:   negative sore throat, tongue or lip swelling  Respiratory:  negative cough, negative dyspnea  Cards:  negative for chest pain, palpitations, lower extremity edema  GI:   See HPI  :  negative for frequency, dysuria  Integument:  negative for rash and pruritus  Heme:  negative for easy bruising and gum/nose bleeding  Musculoskeletal:negative for myalgias, back pain and muscle weakness  Neuro:  negative for headaches, dizziness  Psych: negative for feelings of anxiety, depression     Objective:     Patient Vitals for the past 8 hrs:   BP Temp Pulse Resp SpO2   06/21/22 1037 136/72 98.2 °F (36.8 °C) 81 18 97 %   06/21/22 1018 135/76 98.2 °F (36.8 °C) 79 18 99 %   06/21/22 0935 125/67 98.1 °F (36.7 °C) 79 18 98 %   06/21/22 0822 133/77 98.4 °F (36.9 °C) 76 18 99 %   06/21/22 0800 -- -- 96 -- --   06/21/22 0712 (!) 145/82 98.4 °F (36.9 °C) 81 17 98 %   06/21/22 0642 125/70 98.3 °F (36.8 °C) 89 16 100 %   06/21/22 0606 -- -- (!) 104 -- --   06/21/22 0521 116/69 98.2 °F (36.8 °C) 92 16 99 %   06/21/22 0400 -- -- 99 -- --     06/21 0701 - 06/21 1900  In: 366.3   Out: -   06/19 1901 - 06/21 0700  In: 123.3 [I.V.:123.3]  Out: -     EXAM:     CONST:  Pleasant male lying in bed, no acute distress   NEURO:  Alert and oriented x 3   HEENT: EOMI, no scleral icterus   LUNGS: No acute respiratory distress   CARD:  S1 S2   ABD:  Soft, non distended, no tenderness, no rebound, no guarding. + Bowel sounds.     EXT:  Warm   PSYCH: Full, not anxious or agitated     Data Review     Recent Labs     06/21/22  0248 06/20/22  1515   WBC 6.6 11.2   HGB 5.0* 5.9*   HCT 15.4* 17.7*    272     Recent Labs     06/21/22  0248 06/20/22  1515    141   K 3.9 4.1   * 109*   CO2 23 25   BUN 16 19   CREA 0.54* 0.68*   GLU 83 120*   PHOS 3.1  --    CA 8.3* 8.1*     Recent Labs     06/21/22  0248 06/20/22  1515   AP  --  68   TP  --  5.5*   ALB  -- 2.5*   GLOB  --  3.0   LPSE 97 67*     Recent Labs     06/20/22  1515   INR 1.1   PTP 13.6        Assessment:   · GI bleed: anemia, melena, and hematemesis: Hgb 5.0 (5.9 on presentation, 11.6 on 6/15/22), platelets 029, INR 1.1, BUN 16. Iron 23, iron saturation 8%, TIBC 294, ferritin 46. + NSAID use. No anticoagulation. No history of EGD or colonoscopy. Differential includes peptic ulcer disease, esophageal varices, AVMs, and malignancy. · Acute blood loss anemia: Hgb 5.0  · Hypertension  · Tobacco use     Patient Active Problem List   Diagnosis Code    Acute upper GI bleed K92.2     Plan:   · NPO  · PPI BID  · Receiving 2 of 2 units PRBCs. Repeat CBC after transfusions. · Plan for EGD today with Dr. Nannette Pierce. Details and risks of the procedure to include (but not limited to) anesthesia, bleeding, infection, and perforation were discussed. Patient understands and is in agreement with the plan.    · Discussed with patient's RN  · Patient was discussed with Dr. Nannette Pierce  · Thank you for allowing me to participate in care of Marcela Sharma     Signed By: Waynetta Cheadle, [address]     6/21/2022  10:46 AM

## 2022-06-21 NOTE — ANESTHESIA POSTPROCEDURE EVALUATION
Procedure(s):  ESOPHAGOGASTRODUODENOSCOPY (EGD)  ESOPHAGOGASTRODUODENAL (EGD) BIOPSY. general    Anesthesia Post Evaluation      Multimodal analgesia: multimodal analgesia not used between 6 hours prior to anesthesia start to PACU discharge  Patient location during evaluation: bedside  Patient participation: complete - patient participated  Level of consciousness: awake  Pain score: 0  Pain management: satisfactory to patient  Airway patency: patent  Anesthetic complications: no  Cardiovascular status: acceptable and blood pressure returned to baseline  Respiratory status: acceptable  Hydration status: acceptable  Comments: I have evaluated the patient and meets criteria for discharge from PACU. Brennan Middleton DO. Post anesthesia nausea and vomiting:  none  Final Post Anesthesia Temperature Assessment:  Normothermia (36.0-37.5 degrees C)      INITIAL Post-op Vital signs:   Vitals Value Taken Time   /67 06/21/22 1715   Temp     Pulse 100 06/21/22 1715   Resp 22 06/21/22 1715   SpO2 99 % 06/21/22 1715   Vitals shown include unvalidated device data.

## 2022-06-21 NOTE — PROCEDURES
[address]  [phone]           Endoscopic Gastroduodenoscopy Procedure Note    Daria Deter  1960  [id number]    Indication: GI bleed with hematemesis and melena     : Toby Oconnor MD    Staff: Endoscopy Technician-1: Savanna Li  Endoscopy RN-1: Tomasita Aschoff, RN  Endoscopy RN-Relief: Candia Prader     Referring Provider:  Katia Perales MD      Anesthesia/Sedation:  MAC anesthesia      Procedure Details     After infomed consent was obtained for the procedure, with all risks and benefits of procedure explained the patient was taken to the endoscopy suite and placed in the left lateral decubitus position. Following sequential administration of sedation as per above, the endoscope was inserted into the mouth and advanced under direct vision to second portion of the duodenum. A careful inspection was made as the gastroscope was withdrawn, including a retroflexed view of the proximal stomach; findings and interventions are described below. Findings:   Esophagus:normal. No varices. Stomach: Mild scattered gastric antral erythema and small non-bleeding erosions. Random biopsies obtained. No varices. Duodenum/jejunum: A 15 mm cratered, clean based ulcer without associated bleeding or visible vessel noted in the duodenal sweep. In addition a smaller (5 mm) ulcer noted in the distal duodenal bulb. Otherwise normal exam without blood or active bleeding. Therapies:  biopsy of stomach fundus, body, antrum    Specimens:   ID Type Source Tests Collected by Time Destination   1 : biopsy Preservative Gastric  Bettye Miller MD 6/21/2022 1701 Pathology               EBL: Minimal    Complications:  None; patient tolerated the procedure well. Impression:      -Mild erosive gastropathy involving the antrum - biopsies obtained  -Duodenal ulcers - likely source of bleeding.  No active bleeding or high risk lesions. No treatment required. Recommendations:  -Return patient to hospital floor for ongoing care  -Resume normal medications  -Acid suppression with PPI PO BID for 4 weeks then once daily for 8 weeks then stop.   -Avoid NSAIDss.   -Await pathology. Treat if positive for h.pylori  -Advance diet to soft diet for 3-4 days then advance as tolerated.   -Okay to discharge in am if no further bleeding and tolerating diet  -Follow up with primary care physician as outpatient. No need for GI follow up.  -Please call back if any further questions/concerns.        Juwan Stewart MD  6/21/2022  5:12 PM

## 2022-06-21 NOTE — H&P
[address]   HISTORY AND PHYSICAL    Name:  Princess Mina  MR#:  [id number]  :  1960  ACCOUNT #:  [de-identified]  ADMIT DATE:  2022      The patient was seen, evaluated and admitted by me on 2022. PRIMARY CARE PHYSICIAN:  Truman Goodwin MD    SOURCE OF INFORMATION:  The patient and review of ED electronic medical records. CHIEF COMPLAINT:  Passing dark stool. HISTORY OF PRESENT ILLNESS:  This is a 41-year-old man with past medical history significant for hypertension who was in his usual state of health until the day of his presentation at the emergency room when the patient started passing dark stool. The patient also stated that he vomited bright red blood once. He also complained of abdominal pain. The pain is located at the right side of the abdomen, constant, sharp pain with no radiation, no known aggravating or relieving factors, 6/10 in severity. The patient went to [facility] for further evaluation. When the patient arrived at the emergency room, the patient was found to have a hemoglobin level of 5.9 which was quite a significant drop from the patient's prior hemoglobin. The hospitalist service was asked to directly admit the patient to the hospital for further evaluation and treatment. The patient denies history of GI bleed. The patient has not had colonoscopy done. He also denies using over-the-counter nonsteroidal anti-inflammatory drugs. No fever, no rigors, no chills. No record of prior admission to this hospital.    PAST MEDICAL HISTORY:  Hypertension. ALLERGIES:  NO KNOWN DRUG ALLERGIES. MEDICATIONS:  1.  Norvasc 5 mg daily. 2.  Pepcid 40 mg daily at bedtime. 3.  Neurontin 300 mg daily. FAMILY HISTORY:  This was reviewed. No family history of gastrointestinal disease. PAST SURGICAL HISTORY:  This is significant for orthopedic surgery.     SOCIAL HISTORY:  The patient smokes less than a pack of cigarettes daily. Admits to social consumption of alcohol. REVIEW OF SYSTEMS:  HEAD, EYES, EARS, NOSE, AND THROAT:  No headache, no dizziness, no blurring of vision, no photophobia. RESPIRATORY SYSTEM:  No cough, no shortness of breath, no hemoptysis. CARDIOVASCULAR SYSTEM:  No chest pain, no orthopnea, no palpitation. GASTROINTESTINAL SYSTEM:  This is positive for abdominal pain and vomiting. No diarrhea, no constipation. GENITOURINARY SYSTEM:  No dysuria, no urgency and no frequency. All other systems are reviewed and they are negative. PHYSICAL EXAMINATION:  GENERAL APPEARANCE:  The patient appeared ill, in moderate distress. VITAL SIGNS:  On arrival at the emergency room; temperature 99.7, pulse 100, respiratory rate 17, blood pressure 132/75, oxygen saturation 100%. HEENT:  Head:  Normocephalic, atraumatic. Eyes:  Normal eye movements. No redness, no drainage, no discharge. Ears:  Normal external ears with no evidence of drainage. Nose:  No deformity and no drainage. Mouth and Throat:  No visible oral lesion. Dry oral mucosa. NECK:  Neck is supple. No JVD, no thyromegaly. CHEST:  Clear breath sounds. No wheezing, no crackles. HEART:  Normal S1 and S2.  Regular. No clinically appreciable murmur. ABDOMEN:  Soft. Nontender. Normal bowel sounds. CNS:  Alert, oriented x3. No gross focal neurological deficit. EXTREMITIES:  No edema. Pulses 2+ bilaterally. MUSCULOSKELETAL SYSTEM:  No evidence of joint deformity and swelling. SKIN:  No active skin lesions seen in the exposed part of the body. PSYCHIATRIC:  Normal mood and affect. LYMPHATIC SYSTEM:  No cervical lymphadenopathy. DIAGNOSTIC DATA:  CT scan of the abdomen and pelvis without contrast, no bowel obstruction. LABORATORY DATA:  Hematology:  WBC 11.2, hemoglobin 5.9, hematocrit 17.7, platelets 506. Coagulation profile:  INR 1.1, PT 13.6.   Chemistry:  Sodium 141, potassium 4.1, chloride 109, CO2 of 25, glucose 120, BUN 19, creatinine 0.68, calcium 8.1, total bilirubin 0.1, AST 11, alkaline phosphatase 70, total protein  ,  albumin level 2.5. Lipase level 67. ASSESSMENT:  1. Acute upper gastrointestinal bleed. 2.  Acute blood loss anemia. 3.  Hypertension. 4.  Tobacco abuse. PLAN:  1. Acute upper GI bleed: We will admit the patient for further evaluation and treatment. We will start the patient on Protonix. The patient will be n.p.o. in anticipation of intervention by the gastroenterologist.  Gastroenterology consult will be requested to assist in further evaluation and treatment. We will monitor the patient closely. 2.  Acute blood loss anemia: This is coming from the upper GI bleed. Unfortunately, blood transfusion was not started at [facility] even with hemoglobin level of 5.9. We will transfuse the patient with 2 units of packed red blood cells and monitor the patient's hemoglobin and hematocrit closely. We will also carry out anemia workup. 3.  Hypertension: We will continue with Norvasc and monitor the patient's blood pressure closely. 4.  Tobacco abuse: The patient advised to quit smoking. We will place the patient on Nicoderm patch. OTHER ISSUES:  Code status: The patient is a full code. We will request SCD for DVT prophylaxis. FUNCTIONAL STATUS PRIOR TO ADMISSION:  The patient came from home. The patient is ambulatory with no assistive device. COVID PRECAUTION:  The patient was wearing a face mask. I was wearing a face mask and gloves for this patient's encounter.         MD LAINE [email or web address]  D:  06/21/2022 6:29  T:  06/21/2022 8:11  JOB #:  [id number]  CC:  Boni Lay MD

## 2022-06-22 VITALS
RESPIRATION RATE: 18 BRPM | OXYGEN SATURATION: 93 % | SYSTOLIC BLOOD PRESSURE: 129 MMHG | HEART RATE: 61 BPM | WEIGHT: 112.1 LBS | HEIGHT: 67 IN | DIASTOLIC BLOOD PRESSURE: 60 MMHG | TEMPERATURE: 98.8 F | BODY MASS INDEX: 17.6 KG/M2

## 2022-06-22 PROBLEM — E44.0 MODERATE PROTEIN-CALORIE MALNUTRITION (HCC): Status: ACTIVE | Noted: 2022-06-22

## 2022-06-22 LAB
ABO + RH BLD: NORMAL
ALBUMIN SERPL-MCNC: 2.5 G/DL (ref 3.5–5)
ALBUMIN/GLOB SERPL: 0.8 {RATIO} (ref 1.1–2.2)
ALP SERPL-CCNC: 75 U/L (ref 45–117)
ALT SERPL-CCNC: 18 U/L (ref 12–78)
ANION GAP SERPL CALC-SCNC: 8 MMOL/L (ref 5–15)
AST SERPL-CCNC: 12 U/L (ref 15–37)
BASOPHILS # BLD: 0 K/UL (ref 0–0.1)
BASOPHILS NFR BLD: 0 % (ref 0–1)
BILIRUB SERPL-MCNC: 0.2 MG/DL (ref 0.2–1)
BLD PROD TYP BPU: NORMAL
BLD PROD TYP BPU: NORMAL
BLOOD GROUP ANTIBODIES SERPL: NORMAL
BPU ID: NORMAL
BPU ID: NORMAL
BUN SERPL-MCNC: 12 MG/DL (ref 6–20)
BUN/CREAT SERPL: 15 (ref 12–20)
CALCIUM SERPL-MCNC: 8.3 MG/DL (ref 8.5–10.1)
CHLORIDE SERPL-SCNC: 108 MMOL/L (ref 97–108)
CO2 SERPL-SCNC: 22 MMOL/L (ref 21–32)
CREAT SERPL-MCNC: 0.78 MG/DL (ref 0.7–1.3)
CROSSMATCH RESULT,%XM: NORMAL
CROSSMATCH RESULT,%XM: NORMAL
DIFFERENTIAL METHOD BLD: ABNORMAL
EOSINOPHIL # BLD: 0 K/UL (ref 0–0.4)
EOSINOPHIL NFR BLD: 0 % (ref 0–7)
ERYTHROCYTE [DISTWIDTH] IN BLOOD BY AUTOMATED COUNT: 17.2 % (ref 11.5–14.5)
GLOBULIN SER CALC-MCNC: 3 G/DL (ref 2–4)
GLUCOSE SERPL-MCNC: 186 MG/DL (ref 65–100)
HCT VFR BLD AUTO: 22.7 % (ref 36.6–50.3)
HGB BLD-MCNC: 7.9 G/DL (ref 12.1–17)
IMM GRANULOCYTES # BLD AUTO: 0.1 K/UL (ref 0–0.04)
IMM GRANULOCYTES NFR BLD AUTO: 1 % (ref 0–0.5)
LYMPHOCYTES # BLD: 0.5 K/UL (ref 0.8–3.5)
LYMPHOCYTES NFR BLD: 5 % (ref 12–49)
MCH RBC QN AUTO: 31.9 PG (ref 26–34)
MCHC RBC AUTO-ENTMCNC: 34.8 G/DL (ref 30–36.5)
MCV RBC AUTO: 91.5 FL (ref 80–99)
MONOCYTES # BLD: 0.1 K/UL (ref 0–1)
MONOCYTES NFR BLD: 1 % (ref 5–13)
NEUTS SEG # BLD: 8.5 K/UL (ref 1.8–8)
NEUTS SEG NFR BLD: 93 % (ref 32–75)
NRBC # BLD: 0 K/UL (ref 0–0.01)
NRBC BLD-RTO: 0 PER 100 WBC
PLATELET # BLD AUTO: 232 K/UL (ref 150–400)
PMV BLD AUTO: 9 FL (ref 8.9–12.9)
POTASSIUM SERPL-SCNC: 3.9 MMOL/L (ref 3.5–5.1)
PROT SERPL-MCNC: 5.5 G/DL (ref 6.4–8.2)
RBC # BLD AUTO: 2.48 M/UL (ref 4.1–5.7)
RBC MORPH BLD: ABNORMAL
SODIUM SERPL-SCNC: 138 MMOL/L (ref 136–145)
SPECIMEN EXP DATE BLD: NORMAL
STATUS OF UNIT,%ST: NORMAL
STATUS OF UNIT,%ST: NORMAL
UNIT DIVISION, %UDIV: 0
UNIT DIVISION, %UDIV: 0
WBC # BLD AUTO: 9.2 K/UL (ref 4.1–11.1)

## 2022-06-22 PROCEDURE — 74011000250 HC RX REV CODE- 250: Performed by: INTERNAL MEDICINE

## 2022-06-22 PROCEDURE — 74011250636 HC RX REV CODE- 250/636: Performed by: INTERNAL MEDICINE

## 2022-06-22 PROCEDURE — 74011250637 HC RX REV CODE- 250/637: Performed by: INTERNAL MEDICINE

## 2022-06-22 PROCEDURE — 91300 HC RX REV CODE- 250/636: CPT | Performed by: STUDENT IN AN ORGANIZED HEALTH CARE EDUCATION/TRAINING PROGRAM

## 2022-06-22 PROCEDURE — 88305 TISSUE EXAM BY PATHOLOGIST: CPT

## 2022-06-22 PROCEDURE — 80053 COMPREHEN METABOLIC PANEL: CPT

## 2022-06-22 PROCEDURE — 85025 COMPLETE CBC W/AUTO DIFF WBC: CPT

## 2022-06-22 PROCEDURE — C9113 INJ PANTOPRAZOLE SODIUM, VIA: HCPCS | Performed by: INTERNAL MEDICINE

## 2022-06-22 PROCEDURE — 88342 IMHCHEM/IMCYTCHM 1ST ANTB: CPT

## 2022-06-22 PROCEDURE — 36415 COLL VENOUS BLD VENIPUNCTURE: CPT

## 2022-06-22 PROCEDURE — 74011250636 HC RX REV CODE- 250/636: Performed by: STUDENT IN AN ORGANIZED HEALTH CARE EDUCATION/TRAINING PROGRAM

## 2022-06-22 RX ORDER — PANTOPRAZOLE SODIUM 40 MG/1
40 TABLET, DELAYED RELEASE ORAL 2 TIMES DAILY
Qty: 60 TABLET | Refills: 0 | Status: SHIPPED | OUTPATIENT
Start: 2022-06-22 | End: 2022-07-22

## 2022-06-22 RX ADMIN — PANTOPRAZOLE SODIUM 40 MG: 40 INJECTION, POWDER, FOR SOLUTION INTRAVENOUS at 08:39

## 2022-06-22 RX ADMIN — SODIUM CHLORIDE, PRESERVATIVE FREE 10 ML: 5 INJECTION INTRAVENOUS at 06:00

## 2022-06-22 RX ADMIN — AMLODIPINE BESYLATE 5 MG: 5 TABLET ORAL at 08:39

## 2022-06-22 RX ADMIN — Medication 0.3 ML: at 11:48

## 2022-06-22 NOTE — PROGRESS NOTES
Patient requested Covid vaccine prior to discharge. Dr. Mary Patel approved vaccine administration. Patient identifiers confirmed by vaccine RN. Patient A&Ox4. All questions answered regarding Covid vaccine. Vaccine administered without complication. Vaccine RN remained with patient for five minutes. Patients unit RN Naz Pink notified to recheck patient in ten minutes.   Alex Gamboa RN Covid Vaccine Team

## 2022-06-22 NOTE — DISCHARGE SUMMARY
Discharge Summary       PATIENT ID: Lady Zuñiga  MRN: [id number]   YOB: 1960    DATE OF ADMISSION: 6/20/2022  9:18 PM    DATE OF DISCHARGE: 06/22/22   PRIMARY CARE PROVIDER: Hilario Espinal MD     ATTENDING PHYSICIAN: Selin Barillas MD  DISCHARGING PROVIDER: Selin Barillas MD    To contact this individual call [phone] and ask the  to page. If unavailable ask to be transferred the Adult Hospitalist Department.     CONSULTATIONS: IP CONSULT TO GASTROENTEROLOGY    PROCEDURES/SURGERIES: Procedure(s):  ESOPHAGOGASTRODUODENOSCOPY (EGD)  ESOPHAGOGASTRODUODENAL (EGD) BIOPSY    ADMITTING DIAGNOSES & HOSPITAL COURSE:   HPI: \"This is a 62-year-old man with past medical history significant for hypertension who was in his usual state of health until the day of his presentation at the emergency room when the patient started passing dark stool.  The patient also stated that he vomited bright red blood once. [facility] also complained of abdominal pain.  The pain is located at the right side of the abdomen, constant, sharp pain with no radiation, no known aggravating or relieving factors, 6/10 in severity.  The patient went to [facility] for further evaluation.  When the patient arrived at the emergency room, the patient was found to have a hemoglobin level of 5.9 which was quite a significant drop from the patient's prior hemoglobin.  The hospitalist service was asked to directly admit the patient to the hospital for further evaluation and treatment.  The patient denies history of GI bleed.  The patient has not had colonoscopy done. [facility] also denies using over-the-counter nonsteroidal anti-inflammatory drugs.  No fever, no rigors, no chills.  No record of prior admission to this hospital.\"    Patient was managed for acute GI bleed with acute blood loss anemia status post 2 units PRBC transfusion, Patient underwent EGD showed duodenal ulcers and nonbleeding gastric erosions. Biopsies obtained. Per GI recommendations continue PPI p.o. twice daily for 4 weeks then daily for 8 weeks then stop thereafter. Avoid nsaid, counseled on diet. No need for G f/u per them, f/u with PCP. Tolerated diet with gi bland, hemoglobin stayed stable mid sevens no further episode of bleed. [place]. D/c home today with PPI. Follow-up with PCP outpatient, gi prn. DISCHARGE DIAGNOSES / PLAN:      GI bleed > 2/2 duodenal ulcers   Acute blood loss anemia  -ppi BID  -transfuse 2u prbc 6/21 hgb stable mid 7s this am   -transfuse for hgb less than 7  -Is/p post EGD 6/21 with duodenal ulcers, gastric erosion noted nonbleeding  -Continue PPI a day for 4 weeks followed by daily for 8 weeks and stop thereafter  -GI evaled, can f/u prn  -Biopsies taken for H. pylori testing treat if positive, f/u op   -stable fro dc home     HTN cont home med     Cocaine mis-use  Tobacco abuse   -counceled to quit            Code status: full   Prophylaxis: [address]  discussed with: patient/fam, nurse, cm   Anticipated Disposition: 24 hrs           FOLLOW UP APPOINTMENTS:    Follow-up Information     Follow up With Specialties Details Why Contact Info    Hiral Guido MD Family Medicine In 3 days  [address] [facility] [address] [phone]      Maude Dubois MD Gastroenterology In 1 week  [place] [address]  [phone]             ADDITIONAL CARE RECOMMENDATIONS: Repeat CBC, BMP 3 to 5 days    DIET: gi bland, as tolerated, instructed to avoid NSAIDs, spicy foods, chocolate peppermint that would exacerbate reflux symptoms  Oral Nutritional Supplements: Ensure EnliveOnce daily    ACTIVITY: Activity as tolerated      DISCHARGE MEDICATIONS:  Current Discharge Medication List      START taking these medications    Details   pantoprazole (Protonix) 40 mg tablet Take 1 Tablet by mouth two (2) times a day for 30 days.   Qty: 60 Tablet, Refills: 0  Start date: 6/22/2022, End date: 7/22/2022         CONTINUE these medications which have NOT CHANGED    Details   betamethasone (CELESTONE) 6 mg/mL injection 6 mg by Intra artICUlar route once. !! gabapentin (NEURONTIN) 300 mg capsule 1 cap po at bedtime x 7 days, then 1 cap po bid x 7 days, then tid thereafter, #90 cap      !! gabapentin (NEURONTIN) 300 mg capsule       amLODIPine (NORVASC) 5 mg tablet       nabumetone (RELAFEN) 500 mg tablet        !! - Potential duplicate medications found. Please discuss with provider. STOP taking these medications       famotidine (PEPCID) 40 mg tablet Comments:   Reason for Stopping:                 NOTIFY YOUR PHYSICIAN FOR ANY OF THE FOLLOWING:   Fever over 101 degrees for 24 hours. Chest pain, shortness of breath, fever, chills, nausea, vomiting, diarrhea, change in mentation, falling, weakness, bleeding. Severe pain or pain not relieved by medications. Or, any other signs or symptoms that you may have questions about. DISPOSITION:    Home With:   OT  PT  HH  RN       Long term SNF/Inpatient Rehab   x Independent/assisted living    Hospice    Other:       PATIENT CONDITION AT DISCHARGE:     Functional status    Poor     Deconditioned    x Independent      Cognition    x Lucid     Forgetful     Dementia      Catheters/lines (plus indication)    Li     PICC     PEG    x None      Code status    x Full code     DNR      PHYSICAL EXAMINATION AT DISCHARGE:  I had a face to face encounter with this patient and independently examined them on 6/22/2022 as outlined below:                                                       Constitutional:  No acute distress, cooperative, pleasant    ENT:  Oral mucosa moist, oropharynx benign. Resp:  CTA bilaterally. No wheezing/rhonchi/rales. No accessory muscle use. CV:  Regular rhythm, normal rate, no murmurs, gallops, rubs    GI:  Soft, non distended, non tender.  normoactive bowel sounds, no hepatosplenomegaly     Musculoskeletal: No edema, warm, 2+ pulses throughout    Neurologic:  Moves all extremities.   AAOx3, CN II-XII reviewed           CHRONIC MEDICAL DIAGNOSES:  Problem List as of 6/22/2022 Date Reviewed: 6/21/2022          Codes Class Noted - Resolved    Moderate protein-calorie malnutrition ([facility] [address] ICD-10-CM: E44.0  ICD-9-CM: 263.0  6/22/2022 - Present        * (Principal) Acute upper GI bleed ICD-10-CM: K92.2  ICD-9-CM: 578.9  6/20/2022 - Present              Greater than 30 minutes were spent with the patient on counseling and coordination of care    Signed:   Melly Zazueta MD  6/22/2022  3:04 PM

## 2022-06-22 NOTE — PROGRESS NOTES
CM attempted to speak with patient x2 via room phone, phone continues to ring. Call placed to patient's mobile and phone rings once then dial tone. Call placed to patient's emergency contact, sister, JOSE was left.        4:13 PM  KIARRA Pierre

## 2022-06-22 NOTE — PROGRESS NOTES
2000 Received report from Cassie and [facility] care. 0010 Labs drawn. 0730 Bedside and Verbal shift change report given to timbo (oncoming nurse) by Oralia Street (offgoing nurse). Report included the following information SBAR, Kardex, Intake/Output, MAR and Recent Results.

## 2022-06-22 NOTE — PROGRESS NOTES
Comprehensive Nutrition Assessment    Type and Reason for Visit: Initial    Nutrition Recommendations/Plan:   1. Continue regular diet  2. Ensure enlive BID        Malnutrition Assessment:  Malnutrition Status: Moderate malnutrition (06/22/22 1401)    Context:  Acute illness     Findings of the 6 clinical characteristics of malnutrition:   Energy Intake:  75% or less of est energy req for 7 or more days  Weight Loss:  Greater than 5% over 1 month     Body Fat Loss:  Mild body fat loss, Orbital,Buccal region   Muscle Mass Loss: Moderate muscle mass loss, Temples (temporalis),Clavicles (pectoralis & deltoids),Calf  Fluid Accumulation:  No significant fluid accumulation,     Strength:  Not performed            Nutrition Assessment:    PMHx: HTN    62 y.o. male admitted with acute blood loss anemia 2/2 GI bleed. EGD completed 6/21 revealed multiple duodenal ulcers. Active bleeding no longer present. Visited pt at bedside for low BMI of 17.6. Pt stated he typically eats 2 meals/day, usually eggs, garcia, sausage, jelly toast for breakfast and beans or cheeseburger and fries for dinner. He lost his appetite 1 month ago presumably d/t duodenal ulcers. Stated #, current wt 112#. He stated this wt loss occurred over the last month and he has been feeling very weak in his feet, which causes difficulty walking. Today his appetite is \"okay\". Denied n/v/d. Last BM was pta 2 days which is unusual for him- feeling constipated. Normally has a BM every day. Visual assessment and wt loss consistent with moderate malnutrition. He is agreeable to vanilla ensure enlive.       Nutritionally Significant Medications:  Protonix      Estimated Daily Nutrient Needs:  Energy Requirements Based On: Kcal/kg  Weight Used for Energy Requirements: Current  Energy (kcal/day): [employer]  Weight Used for Protein Requirements: Current  Protein (g/day): 76  Method Used for Fluid Requirements: 1 ml/kcal  Fluid (ml/day): 1800    Nutrition Related Findings:   Edema: No data recorded    Last BM: pta per pt    Wounds: None      Current Nutrition Therapies:  Diet: GI bland  Supplements: none  Meal intake: No data found. Supplement intake: No data found. Nutrition Support: none      Anthropometric Measures:  Height: 5' 7\" (170.2 cm)  Ideal Body Weight (IBW): 148 lbs (67 kg)     Current Body Wt:  50.8 kg (111 lb 15.9 oz), 75.7 % IBW.  Bed scale  Current BMI (kg/m2): 17.5  Usual Body Weight: 59 kg (130 lb)  % Weight Change (Calculated): -13.9  BMI Category: Underweight (BMI less than 18.5)    Wt Readings from Last 10 Encounters:   06/22/22 50.8 kg (112 lb 1.6 oz)   06/20/22 59 kg (130 lb)   06/14/22 59 kg (130 lb)   03/25/22 59 kg (130 lb)   02/19/22 59 kg (130 lb)   12/19/21 59 kg (130 lb)           Nutrition Diagnosis:   · Moderate malnutrition,In context of acute illness or injury related to altered GI function,altered GI structure,other (specify) (lack of appetite r/t duodenal ulcers) as evidenced by poor intake prior to admission,BMI,Criteria as identified in malnutrition assessment,GI abnormality      Nutrition Interventions:   Food and/or Nutrient Delivery: Continue current diet,Start oral nutrition supplement  Nutrition Education/Counseling: No recommendations at this time  Coordination of Nutrition Care: Continue to monitor while inpatient       Goals:     Goals: Meet at least 75% of estimated needs,by next RD assessment       Nutrition Monitoring and Evaluation:   Behavioral-Environmental Outcomes: None identified  Food/Nutrient Intake Outcomes: Food and nutrient intake,Supplement intake  Physical Signs/Symptoms Outcomes: Biochemical data,GI status,Weight,Nutrition focused physical findings,Meal time behavior    Discharge Planning:    Continue oral nutrition supplement,Continue current diet    Leila Matta  Available via [address] [place] [address]

## 2022-06-23 NOTE — ADT AUTH CERT NOTES
Comments  Comment            Patient Demographics    Patient Name   Sri Kim   [id number] Legal Sex   Male    1960 Address   [address] [place] [address] Road,[address] [address] Phone   [phone] (Home)   [phone] (Mobile) *Preferred*     Patient Demographics    Patient Name   Sri Kim   [id number] Legal Sex   Male    1960 Address   [address] [place] [address],[address] [address] Phone   [phone] (Home)   [phone] (Mobile) *Preferred*   CSN:   [id number]     [address] [place] [address] Date: Admit Time Room Bed   2022  9:18  [[id number]] 01 [[id number]]       Attending Providers    Provider Pager From To   Andres Stokes MD  22   Anatoliy Morel MD  22   Pratibha Cedillo MD  22     Emergency Contact(s)    Name Relation Home Work Kelly CELESTIN [phone]       Utilization Reviews         Gastrointestinal Bleeding, Upper - Care Day 2 (2022) by Agustin Dobson RN       Review Entered Review Status   2022 07:37 Completed      Criteria Review      Care Day: 2 Care Date: 2022 Level of Care: Telemetry    Guideline Day 2    Level Of Care    (X) Floor    2022 07:37:53 EDT by Deep Chaidez      telemetry    Clinical Status    (X) * Hypotension absent    2022 07:37:53 EDT by Deep Chaidez      VS: T: 98.8, B/P: 141/83, HR 80, RR 18, SPO2 99 RA    (X) * Bleeding absent or reduced    2022 07:37:53 EDT by Sherri Monge patient received blood  no bleeding noted in chart    Activity    (X) Activity as tolerated    2022 07:37:53 EDT by Deep Chaidez      activity as tolerated w/ assist    Routes    (X) * Oral hydration tolerated    2022 07:37:53 EDT by Deep Chaidez      ADULT DIET Regular; [facility] [place] (GERD/Peptic Ulcer)    (X) * Oral diet tolerated    2022 07:37:53 EDT by Deep Chaidez      ADULT DIET Regular; [facility] [place] (GERD/Peptic Ulcer)    Interventions    (X) * NG tube absent    2022 07:37:53 EDT by Mart Garcia none noted    (X) Hgb/Hct    6/22/2022 07:37:53 EDT by Mart Garcia h/h 5.0/15.4, 7.8/22.9    Medications    (X) Proton pump inhibitor    6/22/2022 07:37:53 EDT by Mart Garcia protonix 40mg iv q 12hrs    6/22/2022 07:37:53 EDT by Sylvia Byers    Subject: Additional Clinical Information    Other abnormal labs: cl 113, creat 0.54, ca 8.3, Iron 23, iron % sat 8      urine: blood trace, leukocyte est mod, epi cells few, mucus trace, wbc 20-30, rbc 0-5, bacteria 1+      UDS: cocaine +       6/22/2022 07:37:53 EDT by Sylvia Byers    Subject: Additional Clinical Information    Other meds      NS @ 25ml/hr      norvasc 5mg po daily      nicoderm CQ 21mg/24hr patch daily       * Milestone   Additional Notes   6/21/2022      Pre-procedure Diagnoses   Gastrointestinal hemorrhage with hematemesis    Post-procedure Diagnoses   Duodenal ulcer   Procedures   EGD       Findings:    Esophagus:normal. No varices. Stomach: Mild scattered gastric antral erythema and small non-bleeding erosions. Random biopsies obtained. No varices. Duodenum/jejunum: A 15 mm cratered, clean based ulcer without associated bleeding or visible vessel noted in the duodenal sweep. In addition a smaller (5 mm) ulcer noted in the distal duodenal bulb. Otherwise normal exam without blood or active bleeding. Impression:       -Mild erosive gastropathy involving the antrum - biopsies obtained   -Duodenal ulcers - likely source of bleeding. No active bleeding or high risk lesions. No treatment required.        Recommendations:   -Return patient to hospital floor for ongoing care   -Resume normal medications   -Acid suppression with PPI PO BID for 4 weeks then once daily for 8 weeks then stop.    -Avoid NSAIDss.    -Await pathology. Treat if positive for h.pylori   -Advance diet to soft diet for 3-4 days then advance as tolerated.        INTERNAL MED   Patient seen and examined at bedside earlier. No complaints, no bm yet. Hgb 5 this am, transfused 2 U PRBC. BP stable    Constitutional: No acute distress, cooperative, pleasant    ENT: Oral mucosa moist, oropharynx benign. Resp: CTA bilaterally. No wheezing/rhonchi/rales. No accessory muscle use. CV: Regular rhythm, normal rate, no murmurs, gallops, rubs    GI: Soft, non distended, non tender. normoactive bowel sounds, no hepatosplenomegaly     Musculoskeletal: No edema, warm, 2+ pulses throughout    Neurologic: Moves all extremities.   AAOx3, CN II-XII reviewed                              Assessment & Plan:    GI bleed   Acute blood loss anemia   -ppi BID   -transfuse 2u prbc 6/21, cont to follow   -transfuse for hgb less than 7   -GI eval and following, plan for EGD today        HTN cont home med       Cocaine mis-use   Tobacco abuse    -counceled to quit         Code status: full    Prophylaxis: [address] discussed with: patient/fam, nurse, cm    Anticipated Disposition: >48 hrs       GI CONSULT   Chief Complaint: black stools   History of Present Illness:  Patient is a 62 y.o. who is seen in consultation at the request of Dr. Abhijit Aiken for upper GI bleed. Yulia Fry has a past medical history significant for hypertension. Ruth Landis presented to the ED at [facility] for evaluation of melena.  In the ED, hemoglobin was 5.9.  Patient was transferred to [facility] for admission on 6/20/22 for acute upper GI bleed.       Patient reports multiple episodes of melena since last Thursday.  No hematochezia. Ruth Landis reports 1 episode of hematemesis on Sunday.  No further vomiting.  No nausea, reflux, or abdominal pain.  No fevers, chills, or unexplained weight loss.  No bowel movement since presentation.  No history of GI bleed.  No known history of liver disease.       + NSAID use (Aleve 1 tab 5 days per week).  No anticoagulation.  No alcohol use.  + Tobacco use.  No history of EGD or colonoscopy.  No family history of colon cancer. Assessment:   GI bleed: anemia, melena, and hematemesis: Hgb 5.0 (5.9 on presentation, 11.6 on 6/15/22), platelets 931, INR 1.1, BUN 16. Iron 23, iron saturation 8%, TIBC 294, ferritin 46. + NSAID use. No anticoagulation. No history of EGD or colonoscopy. Differential includes peptic ulcer disease, esophageal varices, AVMs, and malignancy. Acute blood loss anemia: Hgb 5.0   Hypertension   Tobacco use       Patient Active Problem List   Diagnosis Code   · Acute upper GI bleed K92.2       Plan:    NPO   PPI BID   Receiving 2 of 2 units PRBCs. Repeat CBC after transfusions. Plan for EGD today with Dr. Chrissy Brown. Details and risks of the procedure to include (but not limited to) anesthesia, bleeding, infection, and perforation were discussed. Patient understands and is in agreement with the plan. Discussed with patient's RN   Patient was discussed with Dr. Meek Lao by Agustin Dobson RN       Review Entered Review Status   2022 12:00 In Primary      Criteria Review   slr keep in    We recommend that the following pt's hospitalization under INPATIENT status remain INPATIENT. Name Maya Solano    1960   [facility]# [id number]  Insurance [employer]      Clinical summary 63 y/o gentleman with a PMH significant for Hypertension, arthritis and ongoing Tobacco Use Disorder admitted with acute GI bleed, blood loss anemia and Iron deficiency necessitating management with NPO status, neurovascular checks, serial H/H with transfusion of PRBCs, IVFs, IV PPI,input/output assessments, Iron supplementation and GI consult with recommendations. Vitals /69 /min. Labs and Imaging H/H: 5.9/17.7, Iron level: 23  UM criteria applies Yes  Comments This patient is at increased risk of adverse events and deterioration based on documented clinical data, comorbid conditions and current acute care course. INPATIENT is recommended.  The final decision of the patient's hospitalization status depends on the Attending Physician's judgement.       This chart was reviewed at 11:51 AM 6/21/2022    Mila Woodson, [address] [place] [address]   Cell: [phone]

## 2022-07-01 NOTE — PROGRESS NOTES
Physician Progress Note      PATIENTOzzie Prader  CSN #:                  [id number]  :                       1960  ADMIT DATE:       2022 9:18 PM  [address] [place] DATE:        2022 5:00 PM  RESPONDING  PROVIDER #:        Lorene Ganser MD          QUERY TEXT:    Patient admitted with acute blood loss anemia 2/2 GI bleed. RD saw patient on  and noted moderate malnutrition based on energy intake, weight loss, body fat loss, and muscle mass loss. If possible, please document in progress notes and discharge summary if you are evaluating and /or treating any of the following: The medical record reflects the following:  Risk Factors: 60yo with duodenal ulcers    Clinical Indicators: Malnutrition Status: Moderate malnutrition (22 1401)  Energy Intake:  75% or less of est energy req for 7 or more days  Weight Loss:  Greater than 5% over 1 month  Body Fat Loss:  Mild body fat loss, Orbital,Buccal region  Muscle Mass Loss: Moderate muscle mass loss, Temples (temporalis),Clavicles (pectoralis & deltoids),Calf    Visited pt at bedside for low BMI of 17.6. Pt stated he typically eats 2 meals/day, usually eggs, garcia, sausage, jelly toast for breakfast and beans or cheeseburger and fries for dinner. He lost his appetite 1 month ago presumably d/t duodenal ulcers. Stated #, current wt 112#. He stated this wt loss occurred over the last month and he has been feeling very weak in his feet, which causes difficulty walking. Moderate malnutrition,In context of acute illness or injury related to altered GI function,altered GI structure,other (specify) (lack of appetite r/t duodenal ulcers) as evidenced by poor intake prior to admission,BMI,Criteria as identified in malnutrition assessment,GI abnormality    Treatment: RD visit, nutritional supplements    ASPEN Criteria:  https://aspenjournals. onlinelibrary. hough. com/doi/full/10.1177/3818196832623787    Thank you,  Anita Mora Lay  [phone]  I am also available via [employer]. Options provided:  -- Protein calorie malnutrition moderate  -- Other - I will add my own diagnosis  -- Disagree - Not applicable / Not valid  -- Disagree - Clinically unable to determine / Unknown  -- Refer to Clinical Documentation Reviewer    PROVIDER RESPONSE TEXT:    This patient has moderate protein calorie malnutrition.     Query created by: Tere Juarez on 6/24/2022 10:49 AM      Electronically signed by:  Gary Petty MD 7/1/2022 12:31 PM

## 2022-07-27 ENCOUNTER — TRANSCRIBE ORDER (OUTPATIENT)
Dept: SCHEDULING | Age: 62
End: 2022-07-27

## 2022-07-27 DIAGNOSIS — F17.210 SMOKING GREATER THAN 40 PACK YEARS: Primary | ICD-10-CM

## 2022-07-29 DIAGNOSIS — K92.2 ACUTE UPPER GI BLEED: ICD-10-CM

## 2022-07-29 PROBLEM — A04.8 HELICOBACTER PYLORI (H. PYLORI) INFECTION: Status: ACTIVE | Noted: 2022-06-21

## 2022-07-29 RX ORDER — TRAZODONE HYDROCHLORIDE 50 MG/1
TABLET ORAL
COMMUNITY
Start: 2022-06-28

## 2022-08-01 ENCOUNTER — TRANSCRIBE ORDER (OUTPATIENT)
Dept: SCHEDULING | Age: 62
End: 2022-08-01

## 2022-08-04 ENCOUNTER — TRANSCRIBE ORDER (OUTPATIENT)
Dept: SCHEDULING | Age: 62
End: 2022-08-04

## 2022-08-04 DIAGNOSIS — R93.89 ABNORMAL CHEST X-RAY: ICD-10-CM

## 2022-08-04 DIAGNOSIS — R06.02 SHORTNESS OF BREATH: ICD-10-CM

## 2022-08-04 DIAGNOSIS — R05.9 COUGH: Primary | ICD-10-CM

## 2022-09-22 PROBLEM — Z87.19 HISTORY OF GI BLEED: Status: ACTIVE | Noted: 2022-09-22

## 2022-09-22 PROBLEM — D64.9 ANEMIA: Status: ACTIVE | Noted: 2022-09-22

## 2023-04-21 DIAGNOSIS — F17.210 SMOKING GREATER THAN 40 PACK YEARS: Primary | ICD-10-CM

## (undated) DEVICE — TUBING HYDR IRR --

## (undated) DEVICE — FORCEPS BX L240CM JAW DIA2.8MM L CAP W/ NDL MIC MESH TOOTH